# Patient Record
Sex: MALE | Race: WHITE | NOT HISPANIC OR LATINO
[De-identification: names, ages, dates, MRNs, and addresses within clinical notes are randomized per-mention and may not be internally consistent; named-entity substitution may affect disease eponyms.]

---

## 2023-04-05 PROBLEM — Z00.00 ENCOUNTER FOR PREVENTIVE HEALTH EXAMINATION: Status: ACTIVE | Noted: 2023-04-05

## 2023-04-19 ENCOUNTER — APPOINTMENT (OUTPATIENT)
Dept: VASCULAR SURGERY | Facility: CLINIC | Age: 78
End: 2023-04-19
Payer: MEDICARE

## 2023-04-19 VITALS
HEIGHT: 72 IN | BODY MASS INDEX: 21.13 KG/M2 | HEART RATE: 80 BPM | TEMPERATURE: 97 F | OXYGEN SATURATION: 100 % | DIASTOLIC BLOOD PRESSURE: 64 MMHG | WEIGHT: 156 LBS | SYSTOLIC BLOOD PRESSURE: 127 MMHG

## 2023-04-19 DIAGNOSIS — L97.409 NON-PRESSURE CHRONIC ULCER OF UNSPECIFIED HEEL AND MIDFOOT WITH UNSPECIFIED SEVERITY: ICD-10-CM

## 2023-04-19 DIAGNOSIS — R09.89 OTHER SPECIFIED SYMPTOMS AND SIGNS INVOLVING THE CIRCULATORY AND RESPIRATORY SYSTEMS: ICD-10-CM

## 2023-04-19 PROCEDURE — 99203 OFFICE O/P NEW LOW 30 MIN: CPT

## 2023-04-19 RX ORDER — MIRABEGRON 25 MG/1
25 TABLET, FILM COATED, EXTENDED RELEASE ORAL
Refills: 0 | Status: ACTIVE | COMMUNITY

## 2023-04-19 RX ORDER — CLONAZEPAM 0.12 MG/1
0.12 TABLET, ORALLY DISINTEGRATING ORAL
Refills: 0 | Status: ACTIVE | COMMUNITY

## 2023-04-19 RX ORDER — SERTRALINE HYDROCHLORIDE 100 MG/1
100 TABLET, FILM COATED ORAL
Refills: 0 | Status: ACTIVE | COMMUNITY

## 2023-04-19 RX ORDER — DOXYCYCLINE HYCLATE 100 MG/1
100 TABLET ORAL
Refills: 0 | Status: ACTIVE | COMMUNITY

## 2023-04-19 RX ORDER — AMLODIPINE BESYLATE 10 MG/1
10 TABLET ORAL
Refills: 0 | Status: ACTIVE | COMMUNITY

## 2023-04-19 NOTE — ASSESSMENT
[FreeTextEntry1] : Torey Delacruz is a 77 year old male presents for evaluation. \par \par > UE and LE wounds\par - Will obtain Raynaud's testing on BUE and LOS/PVRs w/ toe pressures for further evaluation.\par - Continue local wound care. Follow up with PCP/wound care regarding left finger wounds. \par - Follow up after above studies completed.

## 2023-04-19 NOTE — PHYSICAL EXAM
[2+] : right 2+ [0] : left 0 [Calm] : calm [de-identified] : Well-appearing  [de-identified] : EOMI, anicteric  [FreeTextEntry1] : BL palmar arch signals present. No ulnar signals present. \par Left DP/PT signals present.  [de-identified] : soft, nt, nd  [de-identified] : motor and sensation intact in all four extremities  [de-identified] : callus at right lateral fifth metatarsal head and left posterior heel. left lateral fifth finger and fourth finger swelling [de-identified] : A&Ox4

## 2023-04-19 NOTE — HISTORY OF PRESENT ILLNESS
[FreeTextEntry1] : Torey Delacruz is a 77 year old male who presents for evaluation of wounds. \par \par Referred by Dr. Jose Fraser (podiatry). \par \par Patient states that he was diagnosed with Raynaud's a while ago. He was diagnosed by his PCP. He has developed fissures and calluses on his feet that aren't healing. This has been ongoing for several years. Due to this, Dr. Fraser recommended evaluation by vascular surgery. He has been using Cerave healing ointment, silvadene. These did not work. Currently, he is using bacitracin in the fingers. Currently seeing wound care Dr. Gaetano Guillory. \par He has previously been evaluated by rheumatology and was placed on amlodipine. He does not regularly follow / rheum. \par \par + PMH: Raynaud's, degenerative disc disease, cervical arthritis, SDH, HTN, depression\par + PSH: Right second toe surgery, s/p hernia surgery, s/p SDH evacuation 10 years ago\par + FH: DM (mother)\par + SH: quit smoking in 1997, no etoh use, no illicit substances\par + Aller: PCN\par \par PCP is Dr. Josue Montana.

## 2023-06-06 ENCOUNTER — APPOINTMENT (OUTPATIENT)
Dept: ORTHOPEDIC SURGERY | Facility: CLINIC | Age: 78
End: 2023-06-06
Payer: MEDICARE

## 2023-06-06 DIAGNOSIS — I73.9 PERIPHERAL VASCULAR DISEASE, UNSPECIFIED: ICD-10-CM

## 2023-06-06 DIAGNOSIS — L97.511 NON-PRESSURE CHRONIC ULCER OF OTHER PART OF RIGHT FOOT LIMITED TO BREAKDOWN OF SKIN: ICD-10-CM

## 2023-06-06 DIAGNOSIS — M21.621 BUNIONETTE OF RIGHT FOOT: ICD-10-CM

## 2023-06-06 DIAGNOSIS — M21.622 BUNIONETTE OF LEFT FOOT: ICD-10-CM

## 2023-06-06 PROCEDURE — 73630 X-RAY EXAM OF FOOT: CPT | Mod: 50

## 2023-06-06 PROCEDURE — 99204 OFFICE O/P NEW MOD 45 MIN: CPT

## 2023-06-06 NOTE — IMAGING
[Bilateral] : foot bilaterally [Weight -] : weightbearing [There are no fractures, subluxations or dislocations. No significant abnormalities are seen] : There are no fractures, subluxations or dislocations. No significant abnormalities are seen [de-identified] : B/L OS peroneum with right 2nd hammer toe

## 2023-06-06 NOTE — HISTORY OF PRESENT ILLNESS
[8] : 8 [10] : 10 [Throbbing] : throbbing [Standing] : standing [Walking] : walking [Lying in bed] : lying in bed [de-identified] : 6/6/23: Patient is a 77 year old M who presents today for evaluation of their B/L feet. Pt states he has intermittent lateral foot pain (L>R) He has been seeing his PCP and Podiatrist which have prescribed him cream for it but doesn’t seem to help. He wears bandaids to the lateral aspect of his feet, which help. He was using lambs wool. Hx raynauds\par WB in sneakers [] : no [FreeTextEntry1] : B/L feet

## 2023-06-06 NOTE — DISCUSSION/SUMMARY
[de-identified] : Would need a vascular eval prior to any surgery. He has a fu appt this Friday.\par Discussed skin care. \par f/u 2 months \par \par Instructions: Entered by Tereza JEAN BAPTISTE acting as scribe.\par Dr. Kelly-\par The documentation recorded by the scribe accurately reflects the service I personally performed and the decisions made by me.\par

## 2023-06-06 NOTE — PHYSICAL EXAM
[Right] : right foot and ankle [3rd] : 3rd [Left] : left foot and ankle [5th] : 5th [NL (20)] : dorsiflexion 20 degrees [NL (40)] : plantar flexion 40 degrees [absent] : dorsalis pedis pulse: absent [] : no achilles tendon insertion tenderness [FreeTextEntry3] : Callous to the 5th metatarsal tuberosity. [de-identified] : cap refill 3 seconds

## 2024-05-31 ENCOUNTER — INPATIENT (INPATIENT)
Facility: HOSPITAL | Age: 79
LOS: 7 days | Discharge: NURSING, REHAB OR LONG-TERM CARE | DRG: 556 | End: 2024-06-08
Attending: INTERNAL MEDICINE | Admitting: INTERNAL MEDICINE
Payer: MEDICARE

## 2024-05-31 VITALS
WEIGHT: 164.91 LBS | RESPIRATION RATE: 18 BRPM | TEMPERATURE: 98 F | SYSTOLIC BLOOD PRESSURE: 128 MMHG | OXYGEN SATURATION: 99 % | DIASTOLIC BLOOD PRESSURE: 79 MMHG | HEART RATE: 94 BPM

## 2024-05-31 DIAGNOSIS — F41.9 ANXIETY DISORDER, UNSPECIFIED: ICD-10-CM

## 2024-05-31 DIAGNOSIS — R29.898 OTHER SYMPTOMS AND SIGNS INVOLVING THE MUSCULOSKELETAL SYSTEM: ICD-10-CM

## 2024-05-31 DIAGNOSIS — Z29.9 ENCOUNTER FOR PROPHYLACTIC MEASURES, UNSPECIFIED: ICD-10-CM

## 2024-05-31 LAB
ALBUMIN SERPL ELPH-MCNC: 3.7 G/DL — SIGNIFICANT CHANGE UP (ref 3.3–5)
ALP SERPL-CCNC: 67 U/L — SIGNIFICANT CHANGE UP (ref 40–120)
ALT FLD-CCNC: 33 U/L — SIGNIFICANT CHANGE UP (ref 12–78)
ANION GAP SERPL CALC-SCNC: 5 MMOL/L — SIGNIFICANT CHANGE UP (ref 5–17)
APTT BLD: 31.8 SEC — SIGNIFICANT CHANGE UP (ref 24.5–35.6)
AST SERPL-CCNC: 31 U/L — SIGNIFICANT CHANGE UP (ref 15–37)
BASOPHILS # BLD AUTO: 0.05 K/UL — SIGNIFICANT CHANGE UP (ref 0–0.2)
BASOPHILS NFR BLD AUTO: 0.6 % — SIGNIFICANT CHANGE UP (ref 0–2)
BILIRUB SERPL-MCNC: 0.5 MG/DL — SIGNIFICANT CHANGE UP (ref 0.2–1.2)
BUN SERPL-MCNC: 27 MG/DL — HIGH (ref 7–23)
CALCIUM SERPL-MCNC: 8.9 MG/DL — SIGNIFICANT CHANGE UP (ref 8.5–10.1)
CHLORIDE SERPL-SCNC: 107 MMOL/L — SIGNIFICANT CHANGE UP (ref 96–108)
CO2 SERPL-SCNC: 26 MMOL/L — SIGNIFICANT CHANGE UP (ref 22–31)
CREAT SERPL-MCNC: 1.3 MG/DL — SIGNIFICANT CHANGE UP (ref 0.5–1.3)
EGFR: 56 ML/MIN/1.73M2 — LOW
EOSINOPHIL # BLD AUTO: 0.12 K/UL — SIGNIFICANT CHANGE UP (ref 0–0.5)
EOSINOPHIL NFR BLD AUTO: 1.3 % — SIGNIFICANT CHANGE UP (ref 0–6)
GLUCOSE SERPL-MCNC: 102 MG/DL — HIGH (ref 70–99)
HCT VFR BLD CALC: 44.2 % — SIGNIFICANT CHANGE UP (ref 39–50)
HGB BLD-MCNC: 14.2 G/DL — SIGNIFICANT CHANGE UP (ref 13–17)
IMM GRANULOCYTES NFR BLD AUTO: 0.3 % — SIGNIFICANT CHANGE UP (ref 0–0.9)
INR BLD: 0.93 RATIO — SIGNIFICANT CHANGE UP (ref 0.85–1.18)
LYMPHOCYTES # BLD AUTO: 0.93 K/UL — LOW (ref 1–3.3)
LYMPHOCYTES # BLD AUTO: 10.4 % — LOW (ref 13–44)
MCHC RBC-ENTMCNC: 28.9 PG — SIGNIFICANT CHANGE UP (ref 27–34)
MCHC RBC-ENTMCNC: 32.1 GM/DL — SIGNIFICANT CHANGE UP (ref 32–36)
MCV RBC AUTO: 89.8 FL — SIGNIFICANT CHANGE UP (ref 80–100)
MONOCYTES # BLD AUTO: 0.68 K/UL — SIGNIFICANT CHANGE UP (ref 0–0.9)
MONOCYTES NFR BLD AUTO: 7.6 % — SIGNIFICANT CHANGE UP (ref 2–14)
NEUTROPHILS # BLD AUTO: 7.15 K/UL — SIGNIFICANT CHANGE UP (ref 1.8–7.4)
NEUTROPHILS NFR BLD AUTO: 79.8 % — HIGH (ref 43–77)
NRBC # BLD: 0 /100 WBCS — SIGNIFICANT CHANGE UP (ref 0–0)
PLATELET # BLD AUTO: 273 K/UL — SIGNIFICANT CHANGE UP (ref 150–400)
POTASSIUM SERPL-MCNC: 4.3 MMOL/L — SIGNIFICANT CHANGE UP (ref 3.5–5.3)
POTASSIUM SERPL-SCNC: 4.3 MMOL/L — SIGNIFICANT CHANGE UP (ref 3.5–5.3)
PROT SERPL-MCNC: 7.7 G/DL — SIGNIFICANT CHANGE UP (ref 6–8.3)
PROTHROM AB SERPL-ACNC: 10.9 SEC — SIGNIFICANT CHANGE UP (ref 9.5–13)
RBC # BLD: 4.92 M/UL — SIGNIFICANT CHANGE UP (ref 4.2–5.8)
RBC # FLD: 13.5 % — SIGNIFICANT CHANGE UP (ref 10.3–14.5)
SODIUM SERPL-SCNC: 138 MMOL/L — SIGNIFICANT CHANGE UP (ref 135–145)
WBC # BLD: 8.96 K/UL — SIGNIFICANT CHANGE UP (ref 3.8–10.5)
WBC # FLD AUTO: 8.96 K/UL — SIGNIFICANT CHANGE UP (ref 3.8–10.5)

## 2024-05-31 PROCEDURE — 99285 EMERGENCY DEPT VISIT HI MDM: CPT

## 2024-05-31 PROCEDURE — 72148 MRI LUMBAR SPINE W/O DYE: CPT | Mod: 26

## 2024-05-31 PROCEDURE — 99223 1ST HOSP IP/OBS HIGH 75: CPT | Mod: GC

## 2024-05-31 PROCEDURE — 72146 MRI CHEST SPINE W/O DYE: CPT | Mod: 26

## 2024-05-31 PROCEDURE — 93010 ELECTROCARDIOGRAM REPORT: CPT

## 2024-05-31 PROCEDURE — 71045 X-RAY EXAM CHEST 1 VIEW: CPT | Mod: 26

## 2024-05-31 PROCEDURE — 72141 MRI NECK SPINE W/O DYE: CPT | Mod: 26

## 2024-05-31 RX ORDER — GABAPENTIN 400 MG/1
300 CAPSULE ORAL
Refills: 0 | Status: DISCONTINUED | OUTPATIENT
Start: 2024-05-31 | End: 2024-06-04

## 2024-05-31 RX ORDER — FLUTICASONE PROPIONATE 50 MCG
1 SPRAY, SUSPENSION NASAL
Refills: 0 | DISCHARGE

## 2024-05-31 RX ORDER — FLUTICASONE PROPIONATE 50 MCG
1 SPRAY, SUSPENSION NASAL
Refills: 0 | Status: DISCONTINUED | OUTPATIENT
Start: 2024-05-31 | End: 2024-06-04

## 2024-05-31 RX ORDER — CALCIUM CARBONATE 500(1250)
1 TABLET ORAL ONCE
Refills: 0 | Status: COMPLETED | OUTPATIENT
Start: 2024-05-31 | End: 2024-05-31

## 2024-05-31 RX ORDER — DEXAMETHASONE 0.5 MG/5ML
10 ELIXIR ORAL EVERY 8 HOURS
Refills: 0 | Status: COMPLETED | OUTPATIENT
Start: 2024-05-31 | End: 2024-06-01

## 2024-05-31 RX ORDER — CYCLOBENZAPRINE HYDROCHLORIDE 10 MG/1
10 TABLET, FILM COATED ORAL THREE TIMES A DAY
Refills: 0 | Status: DISCONTINUED | OUTPATIENT
Start: 2024-05-31 | End: 2024-06-04

## 2024-05-31 RX ORDER — OXYCODONE HYDROCHLORIDE 5 MG/1
5 TABLET ORAL EVERY 6 HOURS
Refills: 0 | Status: DISCONTINUED | OUTPATIENT
Start: 2024-05-31 | End: 2024-06-03

## 2024-05-31 RX ORDER — DULOXETINE HYDROCHLORIDE 30 MG/1
60 CAPSULE, DELAYED RELEASE ORAL DAILY
Refills: 0 | Status: DISCONTINUED | OUTPATIENT
Start: 2024-05-31 | End: 2024-06-04

## 2024-05-31 RX ORDER — SENNA PLUS 8.6 MG/1
2 TABLET ORAL AT BEDTIME
Refills: 0 | Status: DISCONTINUED | OUTPATIENT
Start: 2024-05-31 | End: 2024-06-04

## 2024-05-31 RX ORDER — CLONAZEPAM 1 MG
1 TABLET ORAL
Refills: 0 | DISCHARGE

## 2024-05-31 RX ORDER — POLYETHYLENE GLYCOL 3350 17 G/17G
17 POWDER, FOR SOLUTION ORAL DAILY
Refills: 0 | Status: DISCONTINUED | OUTPATIENT
Start: 2024-05-31 | End: 2024-06-04

## 2024-05-31 RX ORDER — ACETAMINOPHEN 500 MG
650 TABLET ORAL EVERY 6 HOURS
Refills: 0 | Status: DISCONTINUED | OUTPATIENT
Start: 2024-05-31 | End: 2024-06-04

## 2024-05-31 RX ORDER — SODIUM CHLORIDE 9 MG/ML
1000 INJECTION INTRAMUSCULAR; INTRAVENOUS; SUBCUTANEOUS ONCE
Refills: 0 | Status: COMPLETED | OUTPATIENT
Start: 2024-05-31 | End: 2024-05-31

## 2024-05-31 RX ORDER — SODIUM CHLORIDE 9 MG/ML
1000 INJECTION INTRAMUSCULAR; INTRAVENOUS; SUBCUTANEOUS
Refills: 0 | Status: DISCONTINUED | OUTPATIENT
Start: 2024-05-31 | End: 2024-06-01

## 2024-05-31 RX ORDER — DULOXETINE HYDROCHLORIDE 30 MG/1
1 CAPSULE, DELAYED RELEASE ORAL
Refills: 0 | DISCHARGE

## 2024-05-31 RX ADMIN — Medication 102 MILLIGRAM(S): at 15:09

## 2024-05-31 RX ADMIN — GABAPENTIN 300 MILLIGRAM(S): 400 CAPSULE ORAL at 18:28

## 2024-05-31 RX ADMIN — SODIUM CHLORIDE 1000 MILLILITER(S): 9 INJECTION INTRAMUSCULAR; INTRAVENOUS; SUBCUTANEOUS at 12:57

## 2024-05-31 RX ADMIN — Medication 102 MILLIGRAM(S): at 22:30

## 2024-05-31 RX ADMIN — Medication 1 TABLET(S): at 23:57

## 2024-05-31 NOTE — CONSULT NOTE ADULT - SUBJECTIVE AND OBJECTIVE BOX
Patient is a 78yMale walker ambulator who presents to [facility] ED w/ a c/o of BLLE weakness. Patient states that he has been experiencing BLLE weakness for the past. Patient denies any trauma. Denies HS/LOC. Patient states that he has been having difficulty walking, and states that he needed a wheelchair today due to waking up today with severe weakness and inability to ambulate. Denies any numbness or tingling. Denies bowel or bladder incontinence or saddle anesthesia. Denies having any other pain elsewhere. Patient reports a left clavicle fracture from December 2023 which was managed non-op by Dr. Quigley. No other orthopedic concerns at this time.        penicillin (Unknown)      PHYSICAL EXAM:  T(C): 36.5 (05-31-24 @ 11:29), Max: 36.5 (05-31-24 @ 11:29)  HR: 94 (05-31-24 @ 11:29) (94 - 94)  BP: 128/79 (05-31-24 @ 11:29) (128/79 - 128/79)  RR: 18 (05-31-24 @ 11:29) (18 - 18)  SpO2: 99% (05-31-24 @ 11:29) (99% - 99%)    Gen: NAD, Resting comfortably  Spine:  NTTP C/T/L Spines  Motor:                   C5                C6              C7               C8           T1   R            5/5                5/5            5/5             5/5          5/5  L             5/5               5/5             5/5             5/5          5/5                L2             L3             L4               L5            S1  R         2/5           4/5          1/5               1/5           4/5  L          2/5          4/5           2/5              2/5           4/5    No dorsiflexion of R ankle  L Ankle can dorsiflex 5 degrees short of neutral    Sensory:            C5         C6         C7      C8       T1        (0=absent, 1=impaired, 2=normal, NT=not testable)  R         2            2           2        2         2  L          2            2           2        2         2               L2          L3         L4      L5       S1         (0=absent, 1=impaired, 2=normal, NT=not testable)  R         2            2            2        2        2  L          2            2           2        2         2    Negative Guardado, Babinski, Clonus BL  Rectal tone intact    Secondary Survey:   RLE/LLE/RUE/LUE: No TTP over bony prominences, SILT, palpable pulses, full/painless range of motion, compartments soft      A/P: 78M who presents with BLLE weakness    FU MR C/T/L Spines w/w/o  Recommend Decadron 10 mg q8  Analgesia  WBAT  DVT ppx per primary  PT/OT  Final plan pending results of MRI  Discussed with attending who is in agreement with above plan   Patient is a 78yMale walker ambulator who presents to [facility] ED w/ a c/o of BLLE weakness. Patient states that he has been experiencing BLLE weakness for the past. Patient denies any trauma. Denies HS/LOC. Patient states that he has been having difficulty walking, and states that he needed a wheelchair today due to waking up today with severe weakness and inability to ambulate. Denies any numbness or tingling. Denies bowel or bladder incontinence or saddle anesthesia. Denies having any other pain elsewhere. Patient reports a left clavicle fracture from December 2023 which was managed non-op by Dr. Quigley. No other orthopedic concerns at this time.        penicillin (Unknown)      PHYSICAL EXAM:  T(C): 36.5 (05-31-24 @ 11:29), Max: 36.5 (05-31-24 @ 11:29)  HR: 94 (05-31-24 @ 11:29) (94 - 94)  BP: 128/79 (05-31-24 @ 11:29) (128/79 - 128/79)  RR: 18 (05-31-24 @ 11:29) (18 - 18)  SpO2: 99% (05-31-24 @ 11:29) (99% - 99%)    Gen: NAD, Resting comfortably  Spine:  NTTP C/T/L Spines  Motor:                   C5                C6              C7               C8           T1   R            5/5                5/5            5/5             5/5          5/5  L             5/5               5/5             5/5             5/5          5/5                L2             L3             L4               L5            S1  R         2/5           4/5          1/5               1/5           4/5  L          2/5          4/5           2/5              2/5           4/5    No dorsiflexion of R ankle  L Ankle can dorsiflex 5 degrees short of neutral    Sensory:            C5         C6         C7      C8       T1        (0=absent, 1=impaired, 2=normal, NT=not testable)  R         2            2           2        2         2  L          2            2           2        2         2               L2          L3         L4      L5       S1         (0=absent, 1=impaired, 2=normal, NT=not testable)  R         2            2            2        2        2  L          2            2           2        2         2    Negative Guardado, Babinski, Clonus BL  Rectal tone intact    Secondary Survey:   RLE/LLE/RUE/LUE: No TTP over bony prominences, SILT, palpable pulses, full/painless range of motion, compartments soft      A/P: 78M who presents with BLLE weakness    FU MR C/T/L Spines w/w/o  Recommend Decadron 10 mg q8  Analgesia  WBAT  Hold DVT ppx until MRI results and final plan is decided on  PT/OT  Final plan pending results of MRI  Discussed with attending who is in agreement with above plan   Patient is a 78yMale walker ambulator who presents to [facility] ED w/ a c/o of BLLE weakness. Patient states that he has been experiencing BLLE weakness for the past few months. Patient denies any trauma. Denies HS/LOC. Patient states that he has been having difficulty walking, and states that he needed a wheelchair today due to waking up today with severe weakness and inability to ambulate. Denies any numbness or tingling. Denies bowel or bladder incontinence or saddle anesthesia. Denies having any other pain elsewhere. Patient reports a left clavicle fracture from December 2023 which was managed non-op by Dr. Quigley. No other orthopedic concerns at this time.        penicillin (Unknown)      PHYSICAL EXAM:  T(C): 36.5 (05-31-24 @ 11:29), Max: 36.5 (05-31-24 @ 11:29)  HR: 94 (05-31-24 @ 11:29) (94 - 94)  BP: 128/79 (05-31-24 @ 11:29) (128/79 - 128/79)  RR: 18 (05-31-24 @ 11:29) (18 - 18)  SpO2: 99% (05-31-24 @ 11:29) (99% - 99%)    Gen: NAD, Resting comfortably  Spine:  NTTP C/T/L Spines  Motor:                   C5                C6              C7               C8           T1   R            5/5                5/5            5/5             5/5          5/5  L             5/5               5/5             5/5             5/5          5/5                L2             L3             L4               L5            S1  R         2/5           4/5          1/5               1/5           4/5  L          2/5          4/5           2/5              2/5           4/5    No dorsiflexion of R ankle  L Ankle can dorsiflex 5 degrees short of neutral    Sensory:            C5         C6         C7      C8       T1        (0=absent, 1=impaired, 2=normal, NT=not testable)  R         2            2           2        2         2  L          2            2           2        2         2               L2          L3         L4      L5       S1         (0=absent, 1=impaired, 2=normal, NT=not testable)  R         2            2            2        2        2  L          2            2           2        2         2    Negative Guardado, Babinski, Clonus BL  Rectal tone intact    Secondary Survey:   RLE/LLE/RUE/LUE: No TTP over bony prominences, SILT, palpable pulses, full/painless range of motion, compartments soft      A/P: 78M who presents with BLLE weakness    FU MR C/T/L Spines w/w/o  Recommend Decadron 10 mg q8  Analgesia  WBAT  Hold DVT ppx until MRI results and final plan is decided on  PT/OT  Final plan pending results of MRI  Discussed with attending who is in agreement with above plan

## 2024-05-31 NOTE — PATIENT PROFILE ADULT - NSPRESCRALCFREQ_GEN_A_NUR
-in the setting significant dehydration and GI losses with HANANE although was up trending at last blood check 6 months previously  -admission calcium corrects to 12.2, gradually downtrending  -continue IV fluids   -check PTH low, not consistent with primary hyperparathyroidism  Will hold vitamin-D supplementation at discharge      Never

## 2024-05-31 NOTE — PATIENT PROFILE ADULT - FALL HARM RISK - HARM RISK INTERVENTIONS
Assistance with ambulation/Assistance OOB with selected safe patient handling equipment/Communicate Risk of Fall with Harm to all staff/Discuss with provider need for PT consult/Monitor for mental status changes/Monitor gait and stability/Provide patient with walking aids - walker, cane, crutches/Reinforce activity limits and safety measures with patient and family/Reorient to person, place and time as needed/Review medications for side effects contributing to fall risk/Sit up slowly, dangle for a short time, stand at bedside before walking/Tailored Fall Risk Interventions/Use of alarms - bed, chair and/or voice tab/Visual Cue: Yellow wristband and red socks/Bed in lowest position, wheels locked, appropriate side rails in place/Call bell, personal items and telephone in reach/Instruct patient to call for assistance before getting out of bed or chair/Non-slip footwear when patient is out of bed/[place] to call system/Physically safe environment - no spills, clutter or unnecessary equipment/Purposeful Proactive Rounding/Room/bathroom lighting operational, light cord in reach

## 2024-05-31 NOTE — H&P ADULT - PROBLEM SELECTOR PLAN 1
Patient presenting with worsening lower extremity weakness bilaterally, sent to [facility] ED by orthopedics Dr. Richards  - MRI cervical, thoracic and lumbar spine ordered  - Ativan ordered for MRI  - Decadron 10 mg q8 x3 days  - Pain control: gabapentin, flexeril   - WBAT  - PT/OT  - NPO after midnight  - Final plan pending results of MRI  - Ortho Dr. Richards following Patient presenting with worsening lower extremity weakness bilaterally, sent to [facility] ED by orthopedics Dr. Richards  - MRI cervical, thoracic and lumbar spine ordered  - Ativan ordered for MRI  - Decadron 10 mg q8 x3 days  - Pain control: Tylenol PRN, gabapentin, flexeril PRN  - WBAT  - PT/OT  - NPO after midnight  - Final plan pending results of MRI  - Ortho Dr. Richards following Patient presenting with worsening lower extremity weakness bilaterally, sent to [facility] ED by orthopedics Dr. Richards  - MRI cervical, thoracic and lumbar spine ordered  - Ativan ordered for MRI  - Decadron 10 mg q8 x3 days  - Pain control: Tylenol PRN, gabapentin, flexeril PRN  - WBAT  - PT/OT  - NPO after midnight  - Maintenance IVF with NS at 75 cc/hr   - Final plan pending results of MRI  - Ortho Dr. Richards following Patient presenting with worsening lower extremity weakness bilaterally, sent to [facility] ED by orthopedics Dr. Richards  - MRI CERVICAL SPINE: Multilevel degenerative changes. C2-C3 ken[place] spinal cord compression without abnormal intrinsic cord signal. Remaining levels demonstrate predominantly mild spinal canal stenosis. Multilevel neural foraminal narrowing: C2-C3 mild to moderate left neural foraminal narrowing, C3-C4 marked bilateral neural foraminal narrowing, C4-C5 moderate left neural foraminal narrowing, C5-C6 moderate to marked right and mild to moderate left neural foraminal narrowing, C6-C7 moderate to marked right and moderate left neural foraminal narrowing.  - MRI THORACIC SPINE: Multilevel degenerative disc disease resulting in mild multilevel spinal  canal stenosis. Moderate left neural foraminal narrowing at T9 and T11-T12.  - MRI LUMBAR SPINE: Multilevel degenerative changes. L2-L3 marked thecal sac compression, L3-L4 moderate thecal sac compression with marked right and moderate left lateral recess stenosis, L4-L5 marked thecal sac compression, L5-S1 mass effect upon the bilateral descending S1 nerve roots. L3-L4 moderate right neural foraminal narrowing, L4-L5 moderate right neural foraminal narrowing, L5-S1 moderate right and moderate to severe left neural foraminal narrowing.  - Ativan ordered for MRI  - Decadron 10 mg q8 x3 days  - Pain control: Tylenol PRN, gabapentin, flexeril PRN  - WBAT  - PT/OT  - NPO after midnight  - Maintenance IVF with NS at 75 cc/hr   - Ortho Dr. Richards following, f/u final recs Patient presenting with worsening lower extremity weakness bilaterally, sent to [facility] ED by orthopedics Dr. Richards  - MRI CERVICAL SPINE: Multilevel degenerative changes. C2-C3 ken[place] spinal cord compression without abnormal intrinsic cord signal. Remaining levels demonstrate predominantly mild spinal canal stenosis. Multilevel neural foraminal narrowing: C2-C3 mild to moderate left neural foraminal narrowing, C3-C4 marked bilateral neural foraminal narrowing, C4-C5 moderate left neural foraminal narrowing, C5-C6 moderate to marked right and mild to moderate left neural foraminal narrowing, C6-C7 moderate to marked right and moderate left neural foraminal narrowing.  - MRI THORACIC SPINE: Multilevel degenerative disc disease resulting in mild multilevel spinal  canal stenosis. Moderate left neural foraminal narrowing at T9 and T11-T12.  - MRI LUMBAR SPINE: Multilevel degenerative changes. L2-L3 marked thecal sac compression, L3-L4 moderate thecal sac compression with marked right and moderate left lateral recess stenosis, L4-L5 marked thecal sac compression, L5-S1 mass effect upon the bilateral descending S1 nerve roots. L3-L4 moderate right neural foraminal narrowing, L4-L5 moderate right neural foraminal narrowing, L5-S1 moderate right and moderate to severe left neural foraminal narrowing.  - Decadron 10 mg q8 x3 days  - Pain control: Tylenol PRN, gabapentin, flexeril PRN  - WBAT  - PT/OT  - NPO after midnight  - Maintenance IVF with NS at 75 cc/hr   -CARDIAC CLEARANCE REQUESTED Dr Clark  - Ortho Dr. Richards following, f/u final recs Patient presenting with worsening lower extremity weakness bilaterally, sent to \[facility]\"" ED by orthopedics Dr. Richards  - MRI CERVICAL SPINE: Multilevel degenerative changes. C2-C3 ken[place] spinal cord compression without abnormal intrinsic cord signal. Remaining levels demonstrate predominantly mild spinal canal stenosis. Multilevel neural foraminal narrowing: C2-C3 mild to moderate left neural foraminal narrowing, C3-C4 marked bilateral neural foraminal narrowing, C4-C5 moderate left neural foraminal narrowing, C5-C6 moderate to marked right and mild to moderate left neural foraminal narrowing, C6-C7 moderate to marked right and moderate left neural foraminal narrowing.  - MRI THORACIC SPINE: Multilevel degenerative disc disease resulting in mild multilevel spinal  canal stenosis. Moderate left neural foraminal narrowing at T9 and T11-T12.  - MRI LUMBAR SPINE: Multilevel degenerative changes. L2-L3 marked thecal sac compression, L3-L4 moderate thecal sac compression with marked right and moderate left lateral recess stenosis, L4-L5 marked thecal sac compression, L5-S1 mass effect upon the bilateral descending S1 nerve roots. L3-L4 moderate right neural foraminal narrowing, L4-L5 moderate right neural foraminal narrowing, L5-S1 moderate right and moderate to severe left neural foraminal narrowing.  - Decadron 10 mg q8 x3 days  - Pain control: Tylenol PRN for mild pain, oxy 5mg q6 PRN for moderate pain, gabapentin, flexeril PRN  - WBAT  - PT/OT  - NPO after midnight  - Maintenance IVF with NS at 75 cc/hr   -CARDIAC CLEARANCE REQUESTED Dr Clark  - Ortho Dr. Richards following, f/u final recs

## 2024-05-31 NOTE — ED PROVIDER NOTE - PROGRESS NOTE DETAILS
Discussed with orthopedics, in the ED to see the patient.  They will order MRIs, should admit to medicine for further treatment, IV steroids. They will order the MRI and the steroids. Discussed with Dr. Tima Gardner, will see patient to admit.

## 2024-05-31 NOTE — CARE COORDINATION ASSESSMENT. - NSCAREPROVIDERS_GEN_ALL_CORE_FT
CARE PROVIDERS:  Accepting Physician: Tima Gardner  Admitting: Tima Gardner  Attending: Tima Gardner  Consultant: Guicho Wiggins  Consultant: Serafin Fong  Consultant: Tre Clark  Consultant: Silver Lawton  Covering Team: Klever Rich  ED Attending: Rojas Reynolds  ED Nurse: Giulia Banks  Nurse: Natalie Kapoor  Nurse: Leatha Burden  Ordered: ServiceAccount, [facility] HospitalMLM  Primary Team: Tima Gardner  Primary Team: Sondra Haskins  Research: Luz Elena Garcia  : Gahzal Kothari  Team: [facility], Team  UR// Supp. Assoc.: Alivia Burch

## 2024-05-31 NOTE — H&P ADULT - ATTENDING COMMENTS
79yo M PMH spinal stenosis presents to ED with bilateral lower extremity weakness. Admitted for bilateral lower extremity weakness, need for MRI for further evaluation.     HPI as above.     T(C): 37.1 (05-31-24 @ 16:23), Max: 37.1 (05-31-24 @ 16:23)  HR: 87 (05-31-24 @ 16:23) (87 - 94)  BP: 169/81 (05-31-24 @ 16:23) (128/79 - 169/81)  RR: 16 (05-31-24 @ 16:23) (16 - 18)  SpO2: 96% (05-31-24 @ 16:23) (96% - 99%)  Wt(kg): --    Physical Exam:   GENERAL: well-groomed, well-developed, NAD  HEENT: head NC/AT; EOM intact, PERRLA, conjunctiva & sclera clear; hearing grossly intact, moist mucous membranes  NECK: supple, no JVD  RESPIRATORY: CTA B/L, no wheezing, rales, rhonchi or rubs  CARDIOVASCULAR: S1&S2, RRR, no murmurs or gallops  ABDOMEN: soft, non-tender, non-distended, + Bowel sounds x4 quadrants, no guarding, rebound or rigidity  MUSCULOSKELETAL:  no clubbing, cyanosis or edema of all 4 extremities  LYMPH: no cervical lymphadenopathy  VASCULAR: Radial pulses 2+ bilaterally, no varicose veins   SKIN: warm and dry, color normal  NEUROLOGIC: AA&O X3, CN2-12 intact w/ no focal deficits, MAEx4, muscle strength 5/5 UE b/l, RLE and LLE 3/5    EKG NSR without acute ischemic changes.    Plan:  LE weakness: continue decadron  Patient is medically optimized for spinal surgery if clinically indicated. Patient has no modifiable risk factors at this time. No evidence of heart failure on exam.   CARDIAC CLEARANCE IS PENDING  -pain control as ordered    remainder as above

## 2024-05-31 NOTE — H&P ADULT - NSHPREVIEWOFSYSTEMS_GEN_ALL_CORE
CONSTITUTIONAL: denies fever, chills, fatigue, weakness  HEENT: denies blurred vision, sore throat  SKIN: denies new lesions, rash  CARDIOVASCULAR: denies chest pain, chest pressure, palpitations  RESPIRATORY: denies shortness of breath, sputum production  GASTROINTESTINAL: denies nausea, vomiting, diarrhea, abdominal pain  GENITOURINARY: denies dysuria, discharge  NEUROLOGICAL: denies numbness, headache, focal weakness  MUSCULOSKELETAL: denies new joint pain, muscle aches  HEMATOLOGIC: denies gross bleeding, bruising  LYMPHATICS: denies enlarged lymph nodes, extremity swelling  PSYCHIATRIC: denies recent changes in anxiety, depression  ENDOCRINOLOGIC: denies sweating, cold or heat intolerance CONSTITUTIONAL: denies fever, chills, fatigue, weakness  HEENT: denies blurred vision, sore throat  SKIN: denies new lesions, rash  CARDIOVASCULAR: denies chest pain, chest pressure, palpitations  RESPIRATORY: denies shortness of breath, sputum production  GASTROINTESTINAL: denies nausea, vomiting, diarrhea, abdominal pain  GENITOURINARY: denies dysuria, discharge  NEUROLOGICAL: +acute on chronic leg weakness, denies numbness, headache, or other focal weakness  MUSCULOSKELETAL: denies new joint pain, muscle aches  HEMATOLOGIC: denies gross bleeding, bruising  LYMPHATICS: denies enlarged lymph nodes, extremity swelling  PSYCHIATRIC: denies recent changes in anxiety, depression  ENDOCRINOLOGIC: denies sweating, cold or heat intolerance CONSTITUTIONAL: denies fever, chills, fatigue  HEENT: denies blurred vision, sore throat  SKIN: denies new lesions, rash  CARDIOVASCULAR: denies chest pain, chest pressure, palpitations  RESPIRATORY: denies shortness of breath, sputum production  GASTROINTESTINAL: denies nausea, vomiting, diarrhea, abdominal pain  GENITOURINARY: denies dysuria, discharge  NEUROLOGICAL: +acute on chronic leg weakness, denies numbness, headache, or other focal weakness  MUSCULOSKELETAL: denies new joint pain, muscle aches  HEMATOLOGIC: denies gross bleeding, bruising  LYMPHATICS: denies enlarged lymph nodes, extremity swelling  PSYCHIATRIC: denies recent changes in anxiety, depression  ENDOCRINOLOGIC: denies sweating, cold or heat intolerance

## 2024-05-31 NOTE — H&P ADULT - PROBLEM SELECTOR PLAN 2
Per ortho, hold DVT ppx until MRI results DVT ppx:   - Per ortho, hold DVT ppx until MRI results Chronic:  - c/w home Duloxetine  - will hold clonazepam, has not been taking it in recent months

## 2024-05-31 NOTE — H&P ADULT - NSHPPHYSICALEXAM_GEN_ALL_CORE
T(C): 36.5 (05-31-24 @ 11:29), Max: 36.5 (05-31-24 @ 11:29)  HR: 94 (05-31-24 @ 11:29) (94 - 94)  BP: 128/79 (05-31-24 @ 11:29) (128/79 - 128/79)  RR: 18 (05-31-24 @ 11:29) (18 - 18)  SpO2: 99% (05-31-24 @ 11:29) (99% - 99%)  Wt(kg): --    Physical Exam:   GENERAL: well-groomed, well-developed, NAD  HEENT: head NC/AT; EOM intact, PERRLA, conjunctiva & sclera clear; hearing grossly intact, moist mucous membranes  NECK: supple, no JVD  RESPIRATORY: CTA B/L, no wheezing, rales, rhonchi or rubs  CARDIOVASCULAR: S1&S2, RRR, no murmurs or gallops  ABDOMEN: soft, non-tender, non-distended, + Bowel sounds x4 quadrants, no guarding, rebound or rigidity  MUSCULOSKELETAL:  no clubbing, cyanosis or edema of all 4 extremities  LYMPH: no cervical lymphadenopathy  VASCULAR: Radial pulses 2+ bilaterally, no varicose veins   SKIN: warm and dry, color normal  NEUROLOGIC: AA&O X3, CN2-12 intact w/ no focal deficits, no sensory loss, motor Strength 5/5 in UE & LE B/L  Psych: Normal mood and affect, normal behavior T(C): 36.5 (05-31-24 @ 11:29), Max: 36.5 (05-31-24 @ 11:29)  HR: 94 (05-31-24 @ 11:29) (94 - 94)  BP: 128/79 (05-31-24 @ 11:29) (128/79 - 128/79)  RR: 18 (05-31-24 @ 11:29) (18 - 18)  SpO2: 99% (05-31-24 @ 11:29) (99% - 99%)  Wt(kg): --    Physical Exam:   GENERAL: well-groomed, well-developed, NAD  HEENT: head NC/AT; EOM intact, PERRLA, conjunctiva & sclera clear; hearing grossly intact, moist mucous membranes  NECK: supple, no JVD  RESPIRATORY: CTA B/L, no wheezing, rales, rhonchi or rubs  CARDIOVASCULAR: S1&S2, RRR, no murmurs or gallops  ABDOMEN: soft, non-tender, non-distended, + Bowel sounds x4 quadrants, no guarding, rebound or rigidity  MUSCULOSKELETAL:  no clubbing, cyanosis or edema of all 4 extremities  LYMPH: no cervical lymphadenopathy  VASCULAR: Radial pulses 2+ bilaterally, no varicose veins   SKIN: warm and dry, color normal  NEUROLOGIC: AA&O X3, PERRLA   - bilateral hip flexion 3/5 strength,   - bilateral knee flexion 4/5 strength  - bilateral UE 4/5 strength  - no sensory loss throughout extremities  Psych: Normal mood and affect, normal behavior T(C): 36.5 (05-31-24 @ 11:29), Max: 36.5 (05-31-24 @ 11:29)  HR: 94 (05-31-24 @ 11:29) (94 - 94)  BP: 128/79 (05-31-24 @ 11:29) (128/79 - 128/79)  RR: 18 (05-31-24 @ 11:29) (18 - 18)  SpO2: 99% (05-31-24 @ 11:29) (99% - 99%)  Wt(kg): --    Physical Exam:   GENERAL: well-groomed, well-developed, NAD  HEENT: head NC/AT; EOM intact, PERRLA, conjunctiva & sclera clear; hearing grossly intact, moist mucous membranes  NECK: supple, no JVD  RESPIRATORY: CTA B/L, no wheezing, rales, rhonchi or rubs  CARDIOVASCULAR: S1&S2, RRR, no murmurs or gallops  ABDOMEN: soft, non-tender, non-distended, + Bowel sounds x4 quadrants, no guarding, rebound or rigidity  MUSCULOSKELETAL:  no clubbing, cyanosis or edema of all 4 extremities  LYMPH: no cervical lymphadenopathy  VASCULAR: Radial pulses 2+ bilaterally, no varicose veins   SKIN: warm and dry, color normal  NEUROLOGIC: AA&O X3, PERRLA   - bilateral UE 4/5 strength  - bilateral hip flexion 3/5 strength,   - bilateral knee flexion 4/5 strength  - bilateral dorsiflexion 4/5 strength  - bilateral plantar flexion 4/5 strength  - no sensory loss throughout extremities  Psych: Normal mood and affect, normal behavior

## 2024-05-31 NOTE — PATIENT PROFILE ADULT - NUMBER OF YRS
Medicare Part B Preventive Services Limitations Recommendation Scheduled   Bone Mass Measurement  (age 72 & older, biennial) Requires diagnosis related to osteoporosis or estrogen deficiency. Biennial benefit unless patient has history of long-term glucocorticoid tx or baseline is needed because initial test was by other method Every 2 years or more frequently if medically necessary. N/A       Cardiovascular Screening Blood Tests (every 5 years)  Total cholesterol, HDL, Triglycerides Order as a panel if possible Every 5 years. Done:  7/28/2016         Colorectal Cancer Screening  -Fecal occult blood test (annual)  -Flexible sigmoidoscopy (5y)  -Screening colonoscopy (10y)  -Barium Enema Colorectal cancer screening, ages 54-65. For all patients 48 and older:  -Annual fecal occult blood test or  colonoscopy every 10 years or  -Flexible sigmoidoscopy every 5 years or  -lower endoscopy to be performed more frequently, if advised by GI. Done:  11/10/2015         Counseling to Prevent Tobacco Use (up to 8 sessions per year)  - Counseling greater than 3 and up to 10 minutes  - Counseling greater than 10 minutes Patients must be asymptomatic of tobacco-related conditions to receive as preventive service Two cessation counseling attempts (up to 8 counseling sessions) per year. N/A   Diabetes Screening Tests (at least every 3 years, Medicare covers annually or at 6-month intervals for prediabetic patients)    Fasting blood sugar (FBS) or glucose tolerance test (GTT) Patient must be diagnosed with one of the following:  -Hypertension, Dyslipidemia, obesity, previous impaired FBS or GTT  Or any two of the following: overweight, FH of diabetes, age ? 72, history of gestational diabetes, birth of baby weighing more than 9 pounds Annually or every 6 months if previous diagnosis of elevated FBS, elevated HbA1c, or impaired GTT, or glucosuria.  Done:  7/28/2016         Diabetes Self-Management Training (DSMT) (no USPSTF recommendation) Requires referral by treating physician for patient with diabetes or renal disease. 10 hours of initial DSMT session of no less than 30 minutes each in a continuous 12-month period. 2 hours of follow-up DSMT in subsequent years. Up to 10 hours of initial training within a continuous 12 month period of subsequent years: up to 2 hours of follow-up training each year after the initial year. N/A   Glaucoma Screening (no USPSTF recommendation) Diabetes mellitus, family history, , age 48 or over,  American, age 72 or over Annually for covered beneficiaries. Done:  1/2017         Human Immunodeficiency Virus (HIV) Screening (annually for increased risk patients)  HIV-1 and HIV-2 by EIA, FELIX, rapid antibody test, or oral mucosa transudate Patient must be at increased risk for HIV infection per USPSTF guidelines or pregnant. Tests covered annually for patients at increased risk. Pregnant patients may receive up to 3 test during pregnancy. Annually for beneficiaries at increased risk, including anyone who asks for the test. Not high risk   Medical Nutrition Therapy (MNT) (for diabetes or renal disease not recommended schedule) Requires referral by treating physician for patient with diabetes or renal disease. Can be provided in same year as diabetes self-management training (DSMT), and CMS recommends medical nutrition therapy take place after DSMT. Up to 3 hours for initial year and 2 hours in subsequent years. First year: 3 hours of one-on-one counseling or subsequent years: 2 hours.  N/A   Prostate Cancer Screening (annually up to age 76)  - Digital rectal exam (WEN)  - Prostate specific antigen (PSA) Annually (age 48 or over), WEN not paid separately when covered E/M service is provided on same date Once every 12 months for patients age older than 48years of age includes: digital rectal exam and/or prostate specific antigen test. Done:  2/1/2017  PSA: 0.8       Seasonal Influenza Vaccination (annually)  Once per fall or winter season. Done:  12/9/2016         Pneumococcal Vaccination (once after 72)  Once after age 72 and if more than 5 years since last vaccination and/or uncertainty of vaccine status. Pneumococcal:  1/1/2007    Prevnar 13:  8/3/2015   Hepatitis B Vaccinations (if medium/high risk) Medium/high risk factors:  End-stage renal disease,  Hemophiliacs who received Factor VIII or IX concentrates, Clients of institutions for the mentally retarded, Persons who live in the same house as a HepB virus carrier, Homosexual men, Illicit injectable drug abusers. Schedule course of vaccines if patient not previously vaccinated  *additional shots if medically necessary. Not high risk   Screening Mammography (biennial age 54-69)? Annually (age 36 or over) Age 28 through 44: one baseline or aged 36 and older: annually. N/A         Screening Pap Tests and Pelvic Examination (up to age 79 and after 79 if unknown history or abnormal study last 10 years) Every 24 months except high risk Annually if at high risk for developing cervical or vaginal cancer, or childbearing, age with abnormal Pap test within past 3 years or every 2 years for women at normal risk. N/A         Ultrasound Screening for Abdominal Aortic Aneurysm (AAA) (once) Patient must be referred through IPPE and not have had a screening for abdominal aortic aneurysm before under Medicare. Limited to patients who meet one of the following criteria:  - Men who are 73-68 years old and have smoked more than 100 cigarettes in their lifetime.  -Anyone with a FH of AAA  -Anyone recommended for screening by USPSTF Once in a lifetime. N/A           Schedule of Personalized Health Plan  (Provide Copy to Patient)  The best way to stay healthy is to live a healthy lifestyle. A healthy lifestyle includes regular exercise, eating a well-balanced diet, keeping a healthy weight and not smoking.     Regular physical exams and screening tests are another important way to take care of yourself. Preventive exams provided by health care providers can find health problems early when treatment works best and can keep you from getting certain diseases or illnesses. Preventive services include exams, lab tests, screenings, shots, monitoring and information to help you take care of your own health. All people over 65 should have a pneumonia shot. Pneumonia shots are usually only needed once in a lifetime unless your doctor decides differently. All people over 65 should have a yearly flu shot. People over 65 are at medium to high risk for Hepatitis B. Three shots are needed for complete protection. In addition to your physical exam, some screening tests are recommended:    Bone mass measurement (dexa scan) is recommended every two years if you have certain risk factors, such as personal history of vertebral fracture or chronic steroid medication use    Diabetes Mellitus screening is recommended every year. Glaucoma is an eye disease caused by high pressure in the eye. An eye exam is recommended every year. Cardiovascular screening tests that check your cholesterol and other blood fat (lipid) levels are recommended every five years. Colorectal Cancer screening tests help to find pre-cancerous polyps (growths in the colon) so they can be removed before they turn into cancer. Tests ordered for screening depend on your personal and family history risk factors.     Screening for Prostate Cancer is recommended yearly with a digital rectal exam and/or a PSA test    Here is a list of your current Health Maintenance items with a due date:  Health Maintenance   Topic Date Due    GLAUCOMA SCREENING Q2Y  07/22/2017    MEDICARE YEARLY EXAM  02/10/2018    COLONOSCOPY  11/10/2018    DTaP/Tdap/Td series (2 - Td) 07/12/2022    ZOSTER VACCINE AGE 60>  Completed    Pneumococcal 65+ Low/Medium Risk  Completed    INFLUENZA AGE 9 TO ADULT  Completed Preventing Falls: Care Instructions  Your Care Instructions  Getting around your home safely can be a challenge if you have injuries or health problems that make it easy for you to fall. Loose rugs and furniture in walkways are among the dangers for many older people who have problems walking or who have poor eyesight. People who have conditions such as arthritis, osteoporosis, or dementia also have to be careful not to fall. You can make your home safer with a few simple measures. Follow-up care is a key part of your treatment and safety. Be sure to make and go to all appointments, and call your doctor if you are having problems. It's also a good idea to know your test results and keep a list of the medicines you take. How can you care for yourself at home? Taking care of yourself  · You may get dizzy if you do not drink enough water. To prevent dehydration, drink plenty of fluids, enough so that your urine is light yellow or clear like water. Choose water and other caffeine-free clear liquids. If you have kidney, heart, or liver disease and have to limit fluids, talk with your doctor before you increase the amount of fluids you drink. · Exercise regularly to improve your strength, muscle tone, and balance. Walk if you can. Swimming may be a good choice if you cannot walk easily. · Have your vision and hearing checked each year or any time you notice a change. If you have trouble seeing and hearing, you might not be able to avoid objects and could lose your balance. · Know the side effects of the medicines you take. Ask your doctor or pharmacist whether the medicines you take can affect your balance. Sleeping pills or sedatives can affect your balance. · Limit the amount of alcohol you drink. Alcohol can impair your balance and other senses. · Ask your doctor whether calluses or corns on your feet need to be removed.  If you wear loose-fitting shoes because of calluses or corns, you can lose your balance and fall. · Talk to your doctor if you have numbness in your feet. Preventing falls at home  · Remove raised doorway thresholds, throw rugs, and clutter. Repair loose carpet or raised areas in the floor. · Move furniture and electrical cords to keep them out of walking paths. · Use nonskid floor wax, and wipe up spills right away, especially on ceramic tile floors. · If you use a walker or cane, put rubber tips on it. If you use crutches, clean the bottoms of them regularly with an abrasive pad, such as steel wool. · Keep your house well lit, especially [place], and outside walkways. Use night-lights in areas such as hallways and bathrooms. Add extra light switches or use remote switches (such as switches that go on or off when you clap your hands) to make it easier to turn lights on if you have to get up during the night. · Install sturdy handrails on stairways. · Move items in your cabinets so that the things you use a lot are on the lower shelves (about waist level). · Keep a cordless phone and a flashlight with new batteries by your bed. If possible, put a phone in each of the main rooms of your house, or carry a cell phone in case you fall and cannot reach a phone. Or, you can wear a device around your neck or wrist. You push a button that sends a signal for help. · Wear low-heeled shoes that fit well and give your feet good support. Use footwear with nonskid soles. Check the heels and soles of your shoes for wear. Repair or replace worn heels or soles. · Do not wear socks without shoes on wood floors. · Walk on the grass when the sidewalks are slippery. If you live in an area that gets snow and ice in the winter, sprinkle salt on slippery steps and sidewalks. Preventing falls in the bath  · Install grab bars and nonskid mats inside and outside your shower or tub and near the toilet and sinks. · Use shower chairs and bath benches.   · Use a hand-held shower head that will allow you to sit while showering. · Get into a tub or shower by putting the weaker leg in first. Get out of a tub or shower with your strong side first.  · Repair loose toilet seats and consider installing a raised toilet seat to make getting on and off the toilet easier. · Keep your bathroom door unlocked while you are in the shower. Where can you learn more? Go to http://hector-scott.info/. Enter [id number] [id number] in the search box to learn more about \"Preventing Falls: Care Instructions. \"  Current as of: August 4, 2016  Content Version: 11.1  © 0384-0147 [employer]. Care instructions adapted under license by [employer] (which disclaims liability or warranty for this information). If you have questions about a medical condition or this instruction, always ask your healthcare professional. [place] [address] any warranty or liability for your use of this information. Hyperlipidemia: After Your Visit  Your Care Instructions  Hyperlipidemia is too much fat in your blood. The body has several kinds of fat, including cholesterol and triglycerides. Your body needs fat for many things, such as making new cells. But too much fat in your blood increases your chances of having a heart attack or stroke. You may be able to lower your cholesterol and triglycerides with a heart-healthy diet, exercise, and if needed, medicine. Your doctor may want you to try lifestyle changes first to see whether they lower the fat in your blood. You may need to take medicine if lifestyle changes do not lower the fat in your blood enough. Follow-up care is a key part of your treatment and safety. Be sure to make and go to all appointments, and call your doctor if you are having problems. Its also a good idea to know your test results and keep a list of the medicines you take. How can you care for yourself at home? Take your medicines  · Take your medicines exactly as prescribed.  Call your doctor if you think you are having a problem with your medicine. · If you take medicine to lower your cholesterol, go to follow-up visits. You will need to have blood tests. · Do not take large doses of niacin, which is a B vitamin, while taking medicine called statins. It may increase the chance of muscle pain and liver problems. · Talk to your doctor about avoiding grapefruit juice if you are taking statins. Grapefruit juice can raise the level of this medicine in your blood. This could increase side effects. Eat more fruits, vegetables, and fiber  · Fruits and vegetables have lots of nutrients that help protect against heart disease, and they have littleif anyfat. Try to eat at least five servings a day. Dark green, deep orange, or yellow fruits and vegetables are healthy choices. · Keep carrots, celery, and other veggies handy for snacks. Buy fruit that is in season and store it where you can see it so that you will be tempted to eat it. Cook dishes that have a lot of veggies in them, such as stir-fries and soups. · Foods high in fiber may reduce your cholesterol and provide important vitamins and minerals. High-fiber foods include whole-grain cereals and breads, oatmeal, beans, brown rice, citrus fruits, and apples. · Buy whole-grain breads and cereals instead of white bread and pastries. Limit saturated fat  · Read food labels and try to avoid saturated fat and trans fat. They increase your risk of heart disease. · Use olive or canola oil when you cook. Try cholesterol-lowering spreads, such as Benecol or Take Control. · Bake, broil, grill, or steam foods instead of frying them. · Limit the amount of high-fat meats you eat, including hot dogs and sausages. Cut out all visible fat when you prepare meat. · Eat fish, skinless poultry, and soy products such as tofu instead of high-fat meats. Soybeans may be especially good for your heart. Eat at least two servings of fish a week.  Certain fish, such as salmon, contain omega-3 fatty acids, which may help reduce your risk of heart attack. · Choose low-fat or fat-free milk and dairy products. Get exercise, limit alcohol, and quit smoking  · Get more exercise. Work with your doctor to set up an exercise program. Even if you can do only a small amount, exercise will help you get stronger, have more energy, and manage your weight and your stress. Walking is an easy way to get exercise. Gradually increase the amount you walk every day. Aim for at least 30 minutes on most days of the week. You also may want to swim, bike, or do other activities. · Limit alcohol to no more than 2 drinks a day for men and 1 drink a day for women. · Do not smoke. If you need help quitting, talk to your doctor about stop-smoking programs and medicines. These can increase your chances of quitting for good. When should you call for help? Call 911 anytime you think you may need emergency care. For example, call if:  · You have symptoms of a heart attack. These may include:  ¨ Chest pain or pressure, or a strange feeling in the chest.  ¨ Sweating. ¨ Shortness of breath. ¨ Nausea or vomiting. ¨ Pain, pressure, or a strange feeling in the back, neck, jaw, or upper belly or in one or both shoulders or arms. ¨ Lightheadedness or sudden weakness. ¨ A fast or irregular heartbeat. After you call 911, the  may tell you to chew 1 adult-strength or 2 to 4 low-dose aspirin. Wait for an ambulance. Do not try to drive yourself. · You have signs of a stroke. These may include:  ¨ Sudden numbness, paralysis, or weakness in your face, arm, or leg, especially on only one side of your body. ¨ New problems with walking or balance. ¨ Sudden vision changes. ¨ Drooling or slurred speech. ¨ New problems speaking or understanding simple statements, or feeling confused. ¨ A sudden, severe headache that is different from past headaches. · You passed out (lost consciousness).   Call your doctor now or seek immediate medical care if:  · You have muscle pain or weakness. Watch closely for changes in your health, and be sure to contact your doctor if:  · You are very tired. · You have an upset stomach, gas, constipation, or belly pain or cramps. Where can you learn more? Go to [employer].be  Enter C406 in the search box to learn more about \"Hyperlipidemia: After Your Visit. \"   © 1410-1226 [employer]. Care instructions adapted under license by [employer] (which disclaims liability or warranty for this information). This care instruction is for use with your licensed healthcare professional. If you have questions about a medical condition or this instruction, always ask your healthcare professional. [address] any warranty or liability for your use of this information.   Content Version: 9.6.743284; Last Revised: October 13, 2011 20

## 2024-05-31 NOTE — CARE COORDINATION ASSESSMENT. - OTHER PERTINENT REFERRAL INFORMATION
Sw meet with Pt at bedside. Pt is A & O x4. Pt states he was Indep PTA with uses of walker. Pt has 2 MAKAYLA and 15 inside. Pt has raised toliet seat, and cane inside. Pt lives with spouse. NO extra assistance at home. If requested for Pt to go to [facility], but does not want it and would prefer [facility].

## 2024-05-31 NOTE — PATIENT PROFILE ADULT - PUBLIC BENEFITS
Patient Education     Recognizing Skin Cancer  Doing monthly skin checkups is the best way to find new marks or skin changes. During your skin checkups, be sure to follow the ABCDEs of skin checks. This means checking moles or other growths for Asymmetry, Border, Color, Diameter, and Evolving (changing). Note, too, any new growths, or, if any of your growths bleed, itch, or are painful.  The ABCDEs of skin checks  Check your moles or growths for signs of melanoma using ABCDE:  · Asymmetry: the sides of the mole or growth don’t match  · Border: the edges are ragged, notched, or blurred  · Color: the color within the mole or growth varies  · Diameter: the mole or growth is larger than 6 mm (size of a pencil eraser)  · Evolving: the size, shape, or color of the mole or growth is changing (evolving is not shown below.)       In addition to the ABCDEs, other warning signs of skin cancer include:  · A spot or mole that looks different from all other marks on your skin  · Changes in how an area feels, such as itching, tenderness, or pain  · Changes in the skin's surface, such as oozing, bleeding, or scaliness  · A sore that does not heal  · New swelling or redness beyond the border of a mole  Who’s at risk?  Anyone can get skin cancer. But you are at greater risk if you have:  · Fair skin, light-colored hair, or light-colored eyes  · Many moles or abnormal moles on your skin  · A history of sunburns from sunlight or tanning beds  · A family history of skin cancer  · A history of exposure to radiation or chemicals  · A weakened immune system  Also, a personal history of skin cancer puts you at risk for recurring skin cancer.  How to check your skin  Do your monthly skin checkups in front of a full-length mirror. Check all parts of your body, including your:  · Head (ears, face, neck, and scalp)  · Torso (front, back, and sides)  · Arms (tops, undersides, upper, and lower armpits)  · Hands (palms, backs, and fingers,  including under the nails)  · Buttocks and genitals  · Legs (front, back, and sides)  · Feet (tops, soles, toes, including under the nails, and between toes)  If you have a lot of moles, take digital photos of them each month. Make sure to take photos both up close and from a distance. These can help you see if any moles change over time.  When to seek medical treatment  Most skin changes are not cancer. But if you see any changes in your skin, call your doctor right away. Only he or she can diagnose a problem. If you have skin cancer, seeing your doctor can be the first step toward getting the treatment that could save your life.   © 2884-2607 [employer] [address] [place] [address], [place] [address]. All rights reserved. This information is not intended as a substitute for professional medical care. Always follow your healthcare professional's instructions.            no

## 2024-05-31 NOTE — ED PROVIDER NOTE - PHYSICAL EXAMINATION
4/5 strength bilateral lower extremities, significant weakness with dorsiflexion of the feet, proximal lifting of his legs.  Normal distal sensation equal bilaterally.  Diminished reflexes at the knee and ankle.  Normal cap refill, 2+ pulses.

## 2024-05-31 NOTE — PATIENT CHOICE NOTE. - NSPTCHOICESTATE_GEN_ALL_CORE

## 2024-05-31 NOTE — ED PROVIDER NOTE - CLINICAL SUMMARY MEDICAL DECISION MAKING FREE TEXT BOX
Acutely worse generalized weakness in the bilateral lower extremities, with a known history of spinal stenosis.  Will check labs, x-ray, MRI, admission, steroids

## 2024-05-31 NOTE — ED PROVIDER NOTE - OBJECTIVE STATEMENT
78-year-old male with a history of spinal stenosis presents with has had mild chronic weakness for the past several years in his lower extremities.  However over the past 1 week he has had significant increase in his weakness in the legs, now having difficulty ambulating.  Over the past couple days he has been using a walker, and now he can barely lift his legs.  No sensory changes to his legs.  No focal weakness.  No numbness or tingling.  No acute abdominal pain.  No chest pain or shortness of breath.  No upper extremity weakness.  No acute headache.  No neck pain.  No acute back pain.  No aggravating or alleviating factors otherwise noted.  No other acute injury or complaints.  Patient was seen today by his orthopedist Dr. Richards, and sent to the ED for admission and MRI

## 2024-05-31 NOTE — H&P ADULT - ASSESSMENT
77yo M [facility] spinal stenosis presents to ED with bilateral lower extremity weakness. Admitted for bilateral lower extremity weakness, need for MRI for further evaluation.  77yo M [facility] spinal stenosis presents to ED with bilateral lower extremity weakness. Admitted for bilateral lower extremity weakness, need for MRI for further evaluation.

## 2024-05-31 NOTE — H&P ADULT - NSHPSOCIALHISTORY_GEN_ALL_CORE
Marital Status:   Living Situation:   ADLs/Mobility: using a walker, wheelchair lately due to inability to ambulate  Occupation:   Tobacco Use: former smoker, quit ____  Alcohol Use:   Drug Use: Marital Status:   Living Situation: lives with wife at home  ADLs/Mobility: using a walker, wheelchair lately due to inability to ambulate  Occupation:   Tobacco Use: former smoker, quit 27 years ago  Alcohol Use: very rarely, once a year  Drug Use: denies

## 2024-05-31 NOTE — ED ADULT TRIAGE NOTE - CHIEF COMPLAINT QUOTE
I cannot walk or stand (coming on for over a week, getting worse each day) (NOTE: patient is wearing 2 bracelets from previous visits to other hospitals and does not want them removed)

## 2024-05-31 NOTE — ED ADULT NURSE REASSESSMENT NOTE - NS ED NURSE REASSESS COMMENT FT1
1320: Bladder scan done, urine >650 cc. Dr. Reynolds aware of the amt of urine and that the pt refused to be straight cath.

## 2024-05-31 NOTE — H&P ADULT - NSICDXPASTMEDICALHX_GEN_ALL_CORE_FT
PAST MEDICAL HISTORY:  Fracture of left clavicle     Spinal stenosis      PAST MEDICAL HISTORY:  Anxiety     Fracture of left clavicle     Spinal stenosis

## 2024-05-31 NOTE — H&P ADULT - HISTORY OF PRESENT ILLNESS
79yo M PM[facility] spinal stenosis, presents to ED with bilateral lower extremity weakness. Patient was seen by orthopedics Dr. Richards earlier today and sent to the ED for further evaluation.     In the ED  Vitals: T 97.7F, HR 94, /79, RR 18, Sat 99% on RA  Labs: CBC within normal, BUN elevated 27, Cr 1.3 Glucose 102, eGFR low 56  Imaging:   MRI ordered cervical spine NC, lumbar spine NC, thoracic spine NC  CXR official read pending  EKG NSR, VR 83, QT/QTc 378/444  Received 1L NS bolus x1   79yo M PMH spinal stenosis, L clavical fx in 12/2023, anxiety presenting to the ED sent in by Dr. Richards for worsening bilateral lower extremity pain. Pt has multi-level stenosis and has been experiencing mild weakness in his legs for the past few years, which has gotten acutely worse within the last week. Pt endorses immediate leg heaviness and fatigue on ambulation, with frequent cramps. Has been going to PT that  has been managing symptoms up until recently. Denies any trauma to the area. Denies back pain, chest pain, fevers, chills, tingling or numbness in the lower extremities, confusion, or vision changes.       In the ED  Vitals: T 97.7F, HR 94, /79, RR 18, Sat 99% on RA  Labs: CBC within normal, BUN elevated 27, Cr 1.3 Glucose 102, eGFR low 56  Imaging:   MRI ordered cervical spine NC, lumbar spine NC, thoracic spine NC  CXR official read pending  EKG NSR, VR 83, QT/QTc 378/444  Received 1L NS bolus x1 77yo M PMH spinal stenosis, L clavical fx in 12/2023, anxiety presenting to the ED sent in by Dr. Richards for worsening bilateral lower extremity pain. Pt has multi-level stenosis and has been experiencing mild weakness in his legs for the past few years, which has gotten acutely worse within the last week. Pt endorses immediate leg heaviness and fatigue on ambulation, with frequent cramps. Has been going to PT that  has been managing symptoms up until recently. Denies any trauma to the area. Denies back pain, chest pain, fevers, chills, tingling or numbness in the lower extremities, confusion, or vision changes.       In the ED  Vitals: T 97.7F, HR 94, /79, RR 18, Sat 99% on RA  Labs: CBC within normal, BUN elevated 27, Cr 1.3 Glucose 102, eGFR low 56  Imaging:   - MRI CERVICAL SPINE: Multilevel degenerative changes. C2-C3 ken[place] spinal cord compression without abnormal intrinsic cord signal. Remaining levels demonstrate predominantly mild spinal canal stenosis. Multilevel neural foraminal narrowing: C2-C3 mild to moderate left neural foraminal narrowing, C3-C4 marked bilateral neural foraminal narrowing, C4-C5 moderate left neural foraminal narrowing, C5-C6 moderate to marked right and mild to moderate left neural foraminal narrowing, C6-C7 moderate to marked right and moderate left neural foraminal narrowing.  - MRI THORACIC SPINE: Multilevel degenerative disc disease resulting in mild multilevel spinal  canal stenosis. Moderate left neural foraminal narrowing at T9 and T11-T12.  - MRI LUMBAR SPINE: Multilevel degenerative changes. L2-L3 marked thecal sac compression, L3-L4 moderate thecal sac compression with marked right and moderate left lateral recess stenosis, L4-L5 marked thecal sac compression, L5-S1 mass effect upon the bilateral descending S1 nerve roots. L3-L4 moderate right neural foraminal narrowing, L4-L5 moderate right neural foraminal narrowing, L5-S1 moderate right and moderate to severe left neural foraminal narrowing.  CXR official read pending  EKG NSR, VR 83, QT/QTc 378/444  Received 1L NS bolus x1 77yo M PMH spinal stenosis, L clavical fx in 12/2023, anxiety presenting to the ED sent in by Dr. Richards for worsening bilateral lower extremity pain. Pt has multi-level stenosis and has been experiencing mild weakness in his legs for the past few years, which has gotten acutely worse within the last week. Pt endorses immediate leg heaviness and fatigue on ambulation, with frequent cramps. Has been going to PT that  has been managing symptoms up until recently. Denies any trauma to the area. Denies back pain, chest pain, fevers, chills, tingling or numbness in the lower extremities, confusion, or vision changes.   Patient denies any past medical history of DM2/CHF/TIA/CVA/CAD/MI.   Denies any family history of adverse reactions to anesthesia.       In the ED  Vitals: T 97.7F, HR 94, /79, RR 18, Sat 99% on RA  Labs: CBC within normal, BUN elevated 27, Cr 1.3 Glucose 102, eGFR low 56  Imaging:   - MRI CERVICAL SPINE: Multilevel degenerative changes. C2-C3 [place]en[place] spinal cord compression without abnormal intrinsic cord signal. Remaining levels demonstrate predominantly mild spinal canal stenosis. Multilevel neural foraminal narrowing: C2-C3 mild to moderate left neural foraminal narrowing, C3-C4 marked bilateral neural foraminal narrowing, C4-C5 moderate left neural foraminal narrowing, C5-C6 moderate to marked right and mild to moderate left neural foraminal narrowing, C6-C7 moderate to marked right and moderate left neural foraminal narrowing.  - MRI THORACIC SPINE: Multilevel degenerative disc disease resulting in mild multilevel spinal  canal stenosis. Moderate left neural foraminal narrowing at T9 and T11-T12.  - MRI LUMBAR SPINE: Multilevel degenerative changes. L2-L3 marked thecal sac compression, L3-L4 moderate thecal sac compression with marked right and moderate left lateral recess stenosis, L4-L5 marked thecal sac compression, L5-S1 mass effect upon the bilateral descending S1 nerve roots. L3-L4 moderate right neural foraminal narrowing, L4-L5 moderate right neural foraminal narrowing, L5-S1 moderate right and moderate to severe left neural foraminal narrowing.  CXR official read pending  EKG NSR, VR 83, QT/QTc 378/444  Received 1L NS bolus x1

## 2024-05-31 NOTE — PATIENT PROFILE ADULT - SURGICAL SITE DESCRIPTION
Vita Bingham was seen and treated in our emergency department on 2/28/2022  Diagnosis:     [place]    She may return on this date: If you have any questions or concerns, please don't hesitate to call        Carole Eaton MD    ______________________________           _______________          _______________  Hospital Representative                              Date                                Time cervical incision s/p spinal fusion

## 2024-05-31 NOTE — ED ADULT NURSE NOTE - OBJECTIVE STATEMENT
Pt presents to the ED s/p inability to stand/ walk since last Monday. Pt is very angry, soiled with urine and refused for the Rn to assist with changing into a gown. Pt refused to have the RN assess his skin. Pt states" I do not have any open wounds on my backside."

## 2024-06-01 LAB
ALBUMIN SERPL ELPH-MCNC: 3.5 G/DL — SIGNIFICANT CHANGE UP (ref 3.3–5)
ALP SERPL-CCNC: 65 U/L — SIGNIFICANT CHANGE UP (ref 40–120)
ALT FLD-CCNC: 31 U/L — SIGNIFICANT CHANGE UP (ref 12–78)
ANION GAP SERPL CALC-SCNC: 7 MMOL/L — SIGNIFICANT CHANGE UP (ref 5–17)
AST SERPL-CCNC: 33 U/L — SIGNIFICANT CHANGE UP (ref 15–37)
BASOPHILS # BLD AUTO: 0.02 K/UL — SIGNIFICANT CHANGE UP (ref 0–0.2)
BASOPHILS NFR BLD AUTO: 0.2 % — SIGNIFICANT CHANGE UP (ref 0–2)
BILIRUB SERPL-MCNC: 0.5 MG/DL — SIGNIFICANT CHANGE UP (ref 0.2–1.2)
BUN SERPL-MCNC: 33 MG/DL — HIGH (ref 7–23)
CALCIUM SERPL-MCNC: 8.7 MG/DL — SIGNIFICANT CHANGE UP (ref 8.5–10.1)
CHLORIDE SERPL-SCNC: 107 MMOL/L — SIGNIFICANT CHANGE UP (ref 96–108)
CO2 SERPL-SCNC: 23 MMOL/L — SIGNIFICANT CHANGE UP (ref 22–31)
CREAT SERPL-MCNC: 1.3 MG/DL — SIGNIFICANT CHANGE UP (ref 0.5–1.3)
EGFR: 56 ML/MIN/1.73M2 — LOW
EOSINOPHIL # BLD AUTO: 0 K/UL — SIGNIFICANT CHANGE UP (ref 0–0.5)
EOSINOPHIL NFR BLD AUTO: 0 % — SIGNIFICANT CHANGE UP (ref 0–6)
GLUCOSE SERPL-MCNC: 184 MG/DL — HIGH (ref 70–99)
HCT VFR BLD CALC: 40.8 % — SIGNIFICANT CHANGE UP (ref 39–50)
HGB BLD-MCNC: 13.2 G/DL — SIGNIFICANT CHANGE UP (ref 13–17)
IMM GRANULOCYTES NFR BLD AUTO: 0.4 % — SIGNIFICANT CHANGE UP (ref 0–0.9)
LYMPHOCYTES # BLD AUTO: 0.56 K/UL — LOW (ref 1–3.3)
LYMPHOCYTES # BLD AUTO: 7 % — LOW (ref 13–44)
MCHC RBC-ENTMCNC: 28.5 PG — SIGNIFICANT CHANGE UP (ref 27–34)
MCHC RBC-ENTMCNC: 32.4 GM/DL — SIGNIFICANT CHANGE UP (ref 32–36)
MCV RBC AUTO: 88.1 FL — SIGNIFICANT CHANGE UP (ref 80–100)
MONOCYTES # BLD AUTO: 0.11 K/UL — SIGNIFICANT CHANGE UP (ref 0–0.9)
MONOCYTES NFR BLD AUTO: 1.4 % — LOW (ref 2–14)
NEUTROPHILS # BLD AUTO: 7.33 K/UL — SIGNIFICANT CHANGE UP (ref 1.8–7.4)
NEUTROPHILS NFR BLD AUTO: 91 % — HIGH (ref 43–77)
NRBC # BLD: 0 /100 WBCS — SIGNIFICANT CHANGE UP (ref 0–0)
PLATELET # BLD AUTO: 334 K/UL — SIGNIFICANT CHANGE UP (ref 150–400)
POTASSIUM SERPL-MCNC: 5 MMOL/L — SIGNIFICANT CHANGE UP (ref 3.5–5.3)
POTASSIUM SERPL-SCNC: 5 MMOL/L — SIGNIFICANT CHANGE UP (ref 3.5–5.3)
PROT SERPL-MCNC: 7.4 G/DL — SIGNIFICANT CHANGE UP (ref 6–8.3)
RBC # BLD: 4.63 M/UL — SIGNIFICANT CHANGE UP (ref 4.2–5.8)
RBC # FLD: 13.6 % — SIGNIFICANT CHANGE UP (ref 10.3–14.5)
SODIUM SERPL-SCNC: 137 MMOL/L — SIGNIFICANT CHANGE UP (ref 135–145)
WBC # BLD: 8.05 K/UL — SIGNIFICANT CHANGE UP (ref 3.8–10.5)
WBC # FLD AUTO: 8.05 K/UL — SIGNIFICANT CHANGE UP (ref 3.8–10.5)

## 2024-06-01 PROCEDURE — 99233 SBSQ HOSP IP/OBS HIGH 50: CPT

## 2024-06-01 PROCEDURE — 70450 CT HEAD/BRAIN W/O DYE: CPT | Mod: 26,XU

## 2024-06-01 PROCEDURE — 72125 CT NECK SPINE W/O DYE: CPT | Mod: 26

## 2024-06-01 PROCEDURE — 70496 CT ANGIOGRAPHY HEAD: CPT | Mod: 26

## 2024-06-01 PROCEDURE — 70498 CT ANGIOGRAPHY NECK: CPT | Mod: 26

## 2024-06-01 RX ORDER — DEXAMETHASONE 0.5 MG/5ML
10 ELIXIR ORAL EVERY 8 HOURS
Refills: 0 | Status: DISCONTINUED | OUTPATIENT
Start: 2024-06-01 | End: 2024-06-04

## 2024-06-01 RX ORDER — PANTOPRAZOLE SODIUM 20 MG/1
40 TABLET, DELAYED RELEASE ORAL
Refills: 0 | Status: DISCONTINUED | OUTPATIENT
Start: 2024-06-01 | End: 2024-06-04

## 2024-06-01 RX ORDER — CALCIUM CARBONATE 500(1250)
1 TABLET ORAL THREE TIMES A DAY
Refills: 0 | Status: DISCONTINUED | OUTPATIENT
Start: 2024-06-01 | End: 2024-06-04

## 2024-06-01 RX ORDER — LANOLIN ALCOHOL/MO/W.PET/CERES
3 CREAM (GRAM) TOPICAL ONCE
Refills: 0 | Status: COMPLETED | OUTPATIENT
Start: 2024-06-01 | End: 2024-06-01

## 2024-06-01 RX ORDER — ONDANSETRON 8 MG/1
4 TABLET, FILM COATED ORAL ONCE
Refills: 0 | Status: COMPLETED | OUTPATIENT
Start: 2024-06-01 | End: 2024-06-01

## 2024-06-01 RX ADMIN — Medication 1 TABLET(S): at 14:43

## 2024-06-01 RX ADMIN — CYCLOBENZAPRINE HYDROCHLORIDE 10 MILLIGRAM(S): 10 TABLET, FILM COATED ORAL at 00:05

## 2024-06-01 RX ADMIN — Medication 3 MILLIGRAM(S): at 01:38

## 2024-06-01 RX ADMIN — Medication 1 TABLET(S): at 21:58

## 2024-06-01 RX ADMIN — Medication 1 SPRAY(S): at 09:56

## 2024-06-01 RX ADMIN — Medication 102 MILLIGRAM(S): at 14:43

## 2024-06-01 RX ADMIN — ONDANSETRON 4 MILLIGRAM(S): 8 TABLET, FILM COATED ORAL at 14:46

## 2024-06-01 RX ADMIN — Medication 102 MILLIGRAM(S): at 05:52

## 2024-06-01 RX ADMIN — GABAPENTIN 300 MILLIGRAM(S): 400 CAPSULE ORAL at 05:52

## 2024-06-01 RX ADMIN — GABAPENTIN 300 MILLIGRAM(S): 400 CAPSULE ORAL at 18:59

## 2024-06-01 RX ADMIN — PANTOPRAZOLE SODIUM 40 MILLIGRAM(S): 20 TABLET, DELAYED RELEASE ORAL at 05:59

## 2024-06-01 RX ADMIN — DULOXETINE HYDROCHLORIDE 60 MILLIGRAM(S): 30 CAPSULE, DELAYED RELEASE ORAL at 09:56

## 2024-06-01 RX ADMIN — Medication 102 MILLIGRAM(S): at 21:58

## 2024-06-01 NOTE — PROGRESS NOTE ADULT - SUBJECTIVE AND OBJECTIVE BOX
Patient is a 78y old  Male who presents with a chief complaint of lower extremity weakness, inability to walk (01 Jun 2024 07:33)    INTERVAL HPI/OVERNIGHT EVENTS: Patient was seen and examined. eating breakfast. Reports feeling better. denies any chest pain or dypsnea. afebrile.     I&O's Summary    31 May 2024 07:01  -  01 Jun 2024 07:00  --------------------------------------------------------  IN: 0 mL / OUT: 500 mL / NET: -500 mL        LABS:                        13.2   8.05  )-----------( 334      ( 01 Jun 2024 06:27 )             40.8     05-31    138  |  107  |  27<H>  ----------------------------<  102<H>  4.3   |  26  |  1.30    Ca    8.9      31 May 2024 12:50    TPro  7.7  /  Alb  3.7  /  TBili  0.5  /  DBili  x   /  AST  31  /  ALT  33  /  AlkPhos  67  05-31    PT/INR - ( 31 May 2024 12:50 )   PT: 10.9 sec;   INR: 0.93 ratio         PTT - ( 31 May 2024 12:50 )  PTT:31.8 sec  Urinalysis Basic - ( 31 May 2024 12:50 )    Color: x / Appearance: x / SG: x / pH: x  Gluc: 102 mg/dL / Ketone: x  / Bili: x / Urobili: x   Blood: x / Protein: x / Nitrite: x   Leuk Esterase: x / RBC: x / WBC x   Sq Epi: x / Non Sq Epi: x / Bacteria: x      CAPILLARY BLOOD GLUCOSE            Urinalysis Basic - ( 31 May 2024 12:50 )    Color: x / Appearance: x / SG: x / pH: x  Gluc: 102 mg/dL / Ketone: x  / Bili: x / Urobili: x   Blood: x / Protein: x / Nitrite: x   Leuk Esterase: x / RBC: x / WBC x   Sq Epi: x / Non Sq Epi: x / Bacteria: x        MEDICATIONS  (STANDING):  DULoxetine 60 milliGRAM(s) Oral daily  fluticasone propionate 50 MICROgram(s)/spray Nasal Spray 1 Spray(s) Both Nostrils <User Schedule>  gabapentin 300 milliGRAM(s) Oral two times a day  pantoprazole    Tablet 40 milliGRAM(s) Oral before breakfast  polyethylene glycol 3350 17 Gram(s) Oral daily  senna 2 Tablet(s) Oral at bedtime  sodium chloride 0.9%. 1000 milliLiter(s) (75 mL/Hr) IV Continuous <Continuous>    MEDICATIONS  (PRN):  acetaminophen     Tablet .. 650 milliGRAM(s) Oral every 6 hours PRN Temp greater or equal to 38C (100.4F), Mild Pain (1 - 3)  cyclobenzaprine 10 milliGRAM(s) Oral three times a day PRN Muscle Spasm  oxyCODONE    IR 5 milliGRAM(s) Oral every 6 hours PRN Moderate Pain (4 - 6)      REVIEW OF SYSTEMS:  CONSTITUTIONAL: No fever or chills  HEENT:  No headache  RESPIRATORY: No cough or shortness of breath  CARDIOVASCULAR: No chest pain  GASTROINTESTINAL: No abdominal pain, nausea, vomiting, or diarrhea  GENITOURINARY: No dysuria  NEUROLOGICAL: no focal weakness  MUSCULOSKELETAL: no myalgias  PSYCH: no recent changes in mood    RADIOLOGY & ADDITIONAL TESTS:    Imaging Personally Reviewed:  [x] YES  [ ] NO    Consultant(s) Notes Reviewed:  [x] YES  [ ] NO    PHYSICAL EXAM:  T(C): 37 (06-01-24 @ 04:42), Max: 37.1 (05-31-24 @ 16:23)  HR: 96 (06-01-24 @ 04:42) (87 - 108)  BP: 167/80 (06-01-24 @ 06:47) (128/79 - 185/84)  RR: 19 (06-01-24 @ 04:42) (16 - 19)  SpO2: 96% (06-01-24 @ 04:42) (95% - 99%)    GENERAL: awake, alert  HEENT:  anicteric, moist mucous membranes  CHEST/LUNG:  CTA b/l, no rales, wheezes, or rhonchi  HEART:  RRR, S1, S2  ABDOMEN:  BS+, soft, nontender, nondistended  EXTREMITIES: no edema  NERVOUS SYSTEM: answers questions and follows commands appropriately  PSYCH: normal affect    Care Discussed with Consultants/Other Providers [x] YES  [ ] NO

## 2024-06-01 NOTE — PROGRESS NOTE ADULT - ASSESSMENT
77yo M PMH spinal stenosis presents to ED with bilateral lower extremity weakness. Admitted for bilateral lower extremity weakness, need for MRI for further evaluation.     BL lower extremity weakness     - MRI reviewed.     - ortho eval appreciated. Possible OR tomorrow if needed.     - cont decadron/gabapentin/flexeril    - PT/OT when ok with ortho     Anxiety     - cont duloxetine     - not using klonopin at home     elevated BP    - will monitor for now. start BP meds if persistently elevated.     DVT proph: SCDs    Medically optimized for planned orthopedic procedure if indicated  No absolute contraindications  cardio clearance appreciated.   wife was updated over the phone    79yo M PMH spinal stenosis presents to ED with bilateral lower extremity weakness. Admitted for bilateral lower extremity weakness, need for MRI for further evaluation.     BL lower extremity weakness     - MRI reviewed.     - ortho eval appreciated. Possible OR Monday if needed.     - cont decadron/gabapentin/flexeril    - PT/OT discussed with ortho. ok to start at this time given improvement of symptoms.     Anxiety     - cont duloxetine     - not using klonopin at home     elevated BP    - will monitor for now. start BP meds if persistently elevated.     DVT proph: SCDs    Medically optimized for planned orthopedic procedure if indicated  No absolute contraindications  cardio clearance appreciated.   wife was updated over the phone

## 2024-06-01 NOTE — CONSULT NOTE ADULT - SUBJECTIVE AND OBJECTIVE BOX
History of Present Illness:    Past Medical/Surgical History:    Medications:    Family History: Non-contributory family history of premature cardiovascular atherosclerotic disease    Social History: No tobacco, alcohol or drug use    Review of Systems:  General: No fevers, chills, weight gain  Skin: No rashes, color changes  Cardiovascular: No chest pain, orthopnea  Respiratory: No shortness of breath, cough  Gastrointestinal: No nausea, abdominal pain  Genitourinary: No incontinence, pain with urination  Musculoskeletal: No pain, swelling, decreased range of motion  Neurological: No headache, weakness  Psychiatric: No depression, anxiety  Endocrine: No weight gain, increased thirst  All other systems are comprehensively negative.    Physical Exam:  Vitals:        Vital Signs Last 24 Hrs  T(C): 37 (01 Jun 2024 04:42), Max: 37.1 (31 May 2024 16:23)  T(F): 98.6 (01 Jun 2024 04:42), Max: 98.8 (31 May 2024 20:18)  HR: 96 (01 Jun 2024 04:42) (87 - 108)  BP: 167/80 (01 Jun 2024 06:47) (128/79 - 185/84)  BP(mean): --  RR: 19 (01 Jun 2024 04:42) (16 - 19)  SpO2: 96% (01 Jun 2024 04:42) (95% - 99%)    Parameters below as of 01 Jun 2024 04:42  Patient On (Oxygen Delivery Method): room air      General: NAD  HEENT: MMM  Neck: No JVD, no carotid bruit  Lungs: CTAB  CV: RRR, nl S1/S2, no M/R/G  Abdomen: S/NT/ND, +BS  Extremities: No LE edema, no cyanosis  Neuro: AAOx3, non-focal  Skin: No rash    Labs:                        14.2   8.96  )-----------( 273      ( 31 May 2024 12:50 )             44.2     05-31    138  |  107  |  27<H>  ----------------------------<  102<H>  4.3   |  26  |  1.30    Ca    8.9      31 May 2024 12:50    TPro  7.7  /  Alb  3.7  /  TBili  0.5  /  DBili  x   /  AST  31  /  ALT  33  /  AlkPhos  67  05-31        PT/INR - ( 31 May 2024 12:50 )   PT: 10.9 sec;   INR: 0.93 ratio         PTT - ( 31 May 2024 12:50 )  PTT:31.8 sec    ECG/Telemetry:      History of Present Illness: The patient is a 78 year old male with a history of anxiety, GERD, spinal stenosis who presents with worsening bilateral leg pain, heaviness. He denies chest pain or shortness of breath. No exertional symptoms. No recent cardiac testing.    Past Medical/Surgical History:  anxiety, GERD, spinal stenosis    Medications:  Home Medications:  clonazePAM 0.5 mg oral tablet: 1 tab(s) orally 3 times a day (31 May 2024 17:50)  diclofenac sodium 100 mg oral tablet, extended release: 1 tab(s) orally once a day (31 May 2024 13:31)  DULoxetine 60 mg oral delayed release capsule: 1 cap(s) orally once a day (31 May 2024 13:31)  fluticasone 50 mcg/inh nasal spray: 1 spray(s) in each nostril once a day (31 May 2024 13:31)      Family History: Non-contributory family history of premature cardiovascular atherosclerotic disease    Social History: No tobacco, alcohol or drug use    Review of Systems:  General: No fevers, chills, weight gain  Skin: No rashes, color changes  Cardiovascular: No chest pain, orthopnea  Respiratory: No shortness of breath, cough  Gastrointestinal: No nausea, abdominal pain  Genitourinary: No incontinence, pain with urination  Musculoskeletal: No pain, swelling, decreased range of motion  Neurological: No headache, weakness  Psychiatric: No depression, anxiety  Endocrine: No weight gain, increased thirst  All other systems are comprehensively negative.    Physical Exam:  Vitals:        Vital Signs Last 24 Hrs  T(C): 37 (01 Jun 2024 04:42), Max: 37.1 (31 May 2024 16:23)  T(F): 98.6 (01 Jun 2024 04:42), Max: 98.8 (31 May 2024 20:18)  HR: 96 (01 Jun 2024 04:42) (87 - 108)  BP: 167/80 (01 Jun 2024 06:47) (128/79 - 185/84)  BP(mean): --  RR: 19 (01 Jun 2024 04:42) (16 - 19)  SpO2: 96% (01 Jun 2024 04:42) (95% - 99%)  Parameters below as of 01 Jun 2024 04:42  Patient On (Oxygen Delivery Method): room air  General: NAD  HEENT: MMM  Neck: No JVD, no carotid bruit  Lungs: CTAB  CV: RRR, nl S1/S2, no M/R/G  Abdomen: S/NT/ND, +BS  Extremities: No LE edema, no cyanosis  Neuro: AAOx3, non-focal  Skin: No rash    Labs:                        14.2   8.96  )-----------( 273      ( 31 May 2024 12:50 )             44.2     05-31    138  |  107  |  27<H>  ----------------------------<  102<H>  4.3   |  26  |  1.30    Ca    8.9      31 May 2024 12:50    TPro  7.7  /  Alb  3.7  /  TBili  0.5  /  DBili  x   /  AST  31  /  ALT  33  /  AlkPhos  67  05-31        PT/INR - ( 31 May 2024 12:50 )   PT: 10.9 sec;   INR: 0.93 ratio         PTT - ( 31 May 2024 12:50 )  PTT:31.8 sec    ECG/Telemetry: NSR, LAD, no ST abnormality

## 2024-06-01 NOTE — PROGRESS NOTE ADULT - SUBJECTIVE AND OBJECTIVE BOX
Patient seen and examined at bedside. No acute complaints at this time. Pt endorses improvement in sx, notes he was able to stand and walk 1-2 steps to chair w/o assistance yesterday. Denies chest pain, shortness of breath, nausea or vomiting.     PE:  Vital Signs Last 24 Hrs  T(C): 37 (06-01-24 @ 04:42), Max: 37.1 (05-31-24 @ 16:23)  T(F): 98.6 (06-01-24 @ 04:42), Max: 98.8 (05-31-24 @ 20:18)  HR: 96 (06-01-24 @ 04:42) (87 - 108)  BP: 167/80 (06-01-24 @ 06:47) (128/79 - 185/84)  BP(mean): --  RR: 19 (06-01-24 @ 04:42) (16 - 19)  SpO2: 96% (06-01-24 @ 04:42) (95% - 99%)    General: NAD, resting comforatbly in bed  No midline or paraspinal TTP C/T/Lsp  No bony stepoffs  Neg colin  Neg Babinski  Neg Clonus  No Saddle Anesthesia  Passive/Active Rectal tone intact    Motor:                   C5                C6              C7               C8           T1   R             5/5                5/5            5/5              5/5          5/5  L             5/5                5/5            5/5              5/5          5/5                    L2                  L3             L4              L5            S1  R            5/5                4+/5             2/5*            0/5          5/5  L             5/5                5/5            4/5*            5/5          5/5    *BL Foot Drop - R DF To about 10-15 degrees below neutral, 2/5. L DF to about neutral, 5/5 at neutral    Sensory:            C5         C6         C7      C8       T1        (0=absent, 1=impaired, 2=normal, NT=not testable)  R         2            2           2        2         2  L          2            2           2        2         2               L2          L3         L4      L5       S1         (0=absent, 1=impaired, 2=normal, NT=not testable)  R         2            2            2        2        2  L          2            2           2        2         2        PT/INR - ( 31 May 2024 12:50 )   PT: 10.9 sec;   INR: 0.93 ratio         PTT - ( 31 May 2024 12:50 )  PTT:31.8 sec    A/P: 78M who presents with BLLE weakness    MRI Reviewed and discussed.   Decadron 10 mg q8, Gabapentin 300mg BID, Flexeril 10mg TID  Analgesia  WBAT  Hold DVT ppx until MRI results and final plan is decided on  PT/OT  Medical management appreciated. PLEASE DOCUMENT MEDICAL CLEARANCE FOR POSSIBLE OR TOMORROW.   Cardiac management appreciated. PLEASE DOCUMENT CARDIAC CLEARANCE FOR POSSIBLE OR TOMORROW.   Further Plan regarding OR pending discussion between Maurice and pt later today.   Discussed with attending who is in agreement with above plan Patient seen and examined at bedside. No acute complaints at this time. Pt endorses improvement in sx, notes he was able to stand and walk 1-2 steps to chair w/o assistance yesterday. Denies chest pain, shortness of breath, nausea or vomiting.     PE:  Vital Signs Last 24 Hrs  T(C): 37 (06-01-24 @ 04:42), Max: 37.1 (05-31-24 @ 16:23)  T(F): 98.6 (06-01-24 @ 04:42), Max: 98.8 (05-31-24 @ 20:18)  HR: 96 (06-01-24 @ 04:42) (87 - 108)  BP: 167/80 (06-01-24 @ 06:47) (128/79 - 185/84)  BP(mean): --  RR: 19 (06-01-24 @ 04:42) (16 - 19)  SpO2: 96% (06-01-24 @ 04:42) (95% - 99%)    General: NAD, resting comforatbly in bed  No midline or paraspinal TTP C/T/Lsp  No bony stepoffs  Neg colin  Neg Babinski  Neg Clonus  No Saddle Anesthesia  Passive/Active Rectal tone intact    Motor:                   C5                C6              C7               C8           T1   R             5/5                5/5            5/5              5/5          5/5  L             5/5                5/5            5/5              5/5          5/5                    L2                  L3             L4              L5            S1  R            5/5                4+/5             2/5*            0/5          5/5  L             5/5                5/5            4/5*            5/5          5/5    *BL Foot Drop - R DF To about 10-15 degrees below neutral, 2/5. L DF to about neutral, 5/5 at neutral    Sensory:            C5         C6         C7      C8       T1        (0=absent, 1=impaired, 2=normal, NT=not testable)  R         2            2           2        2         2  L          2            2           2        2         2               L2          L3         L4      L5       S1         (0=absent, 1=impaired, 2=normal, NT=not testable)  R         2            2            2        2        2  L          2            2           2        2         2        PT/INR - ( 31 May 2024 12:50 )   PT: 10.9 sec;   INR: 0.93 ratio         PTT - ( 31 May 2024 12:50 )  PTT:31.8 sec    A/P: 78M who presents with BLLE weakness    Pt seen w/ Dr. Richards at bedside. Pt with significant improvement in strength. At this time, no plan for OR this weekend, likely plan for OR thursday (6/6) for C1-C2 ACDF. CT H/Csp Ordered. CTa H/Neck Ordered for preoperative planning. Dr. Richards plans to see patient tomorrow, if no improvement compared to today or if pt worsens, may plan for OR monday. Plan pending evaluation tomorrow.   MRI Reviewed and discussed with patient at bedside.   Decadron 10 mg q8, Gabapentin 300mg BID, Flexeril 10mg TID  Analgesia  WBAT, PT/OT  DVT PPx: Hold chemical DVT PPx until final plan. SCDs.   Medical management appreciated. PLEASE DOCUMENT MEDICAL CLEARANCE FOR POSSIBLE OR TOMORROW.   Cardiac management appreciated. PLEASE DOCUMENT CARDIAC CLEARANCE FOR POSSIBLE OR TOMORROW.   Discussed with attending who is in agreement with above plan

## 2024-06-02 LAB
ALBUMIN SERPL ELPH-MCNC: 3.5 G/DL — SIGNIFICANT CHANGE UP (ref 3.3–5)
ALP SERPL-CCNC: 58 U/L — SIGNIFICANT CHANGE UP (ref 40–120)
ALT FLD-CCNC: 30 U/L — SIGNIFICANT CHANGE UP (ref 12–78)
ANION GAP SERPL CALC-SCNC: 6 MMOL/L — SIGNIFICANT CHANGE UP (ref 5–17)
APTT BLD: 27.2 SEC — SIGNIFICANT CHANGE UP (ref 24.5–35.6)
AST SERPL-CCNC: 21 U/L — SIGNIFICANT CHANGE UP (ref 15–37)
BILIRUB SERPL-MCNC: 0.4 MG/DL — SIGNIFICANT CHANGE UP (ref 0.2–1.2)
BUN SERPL-MCNC: 44 MG/DL — HIGH (ref 7–23)
CALCIUM SERPL-MCNC: 8.8 MG/DL — SIGNIFICANT CHANGE UP (ref 8.5–10.1)
CHLORIDE SERPL-SCNC: 108 MMOL/L — SIGNIFICANT CHANGE UP (ref 96–108)
CO2 SERPL-SCNC: 23 MMOL/L — SIGNIFICANT CHANGE UP (ref 22–31)
CREAT SERPL-MCNC: 1.3 MG/DL — SIGNIFICANT CHANGE UP (ref 0.5–1.3)
EGFR: 56 ML/MIN/1.73M2 — LOW
GLUCOSE SERPL-MCNC: 180 MG/DL — HIGH (ref 70–99)
HCT VFR BLD CALC: 39 % — SIGNIFICANT CHANGE UP (ref 39–50)
HGB BLD-MCNC: 12.3 G/DL — LOW (ref 13–17)
INR BLD: 0.87 RATIO — SIGNIFICANT CHANGE UP (ref 0.85–1.18)
MAGNESIUM SERPL-MCNC: 2.6 MG/DL — SIGNIFICANT CHANGE UP (ref 1.6–2.6)
MCHC RBC-ENTMCNC: 28.5 PG — SIGNIFICANT CHANGE UP (ref 27–34)
MCHC RBC-ENTMCNC: 31.5 GM/DL — LOW (ref 32–36)
MCV RBC AUTO: 90.5 FL — SIGNIFICANT CHANGE UP (ref 80–100)
NRBC # BLD: 0 /100 WBCS — SIGNIFICANT CHANGE UP (ref 0–0)
PLATELET # BLD AUTO: 312 K/UL — SIGNIFICANT CHANGE UP (ref 150–400)
POTASSIUM SERPL-MCNC: 4.6 MMOL/L — SIGNIFICANT CHANGE UP (ref 3.5–5.3)
POTASSIUM SERPL-SCNC: 4.6 MMOL/L — SIGNIFICANT CHANGE UP (ref 3.5–5.3)
PROT SERPL-MCNC: 6.7 G/DL — SIGNIFICANT CHANGE UP (ref 6–8.3)
PROTHROM AB SERPL-ACNC: 10.2 SEC — SIGNIFICANT CHANGE UP (ref 9.5–13)
RBC # BLD: 4.31 M/UL — SIGNIFICANT CHANGE UP (ref 4.2–5.8)
RBC # FLD: 13.5 % — SIGNIFICANT CHANGE UP (ref 10.3–14.5)
SODIUM SERPL-SCNC: 137 MMOL/L — SIGNIFICANT CHANGE UP (ref 135–145)
WBC # BLD: 12.07 K/UL — HIGH (ref 3.8–10.5)
WBC # FLD AUTO: 12.07 K/UL — HIGH (ref 3.8–10.5)

## 2024-06-02 PROCEDURE — 99233 SBSQ HOSP IP/OBS HIGH 50: CPT

## 2024-06-02 RX ADMIN — Medication 102 MILLIGRAM(S): at 14:36

## 2024-06-02 RX ADMIN — Medication 102 MILLIGRAM(S): at 05:35

## 2024-06-02 RX ADMIN — PANTOPRAZOLE SODIUM 40 MILLIGRAM(S): 20 TABLET, DELAYED RELEASE ORAL at 05:36

## 2024-06-02 RX ADMIN — GABAPENTIN 300 MILLIGRAM(S): 400 CAPSULE ORAL at 17:45

## 2024-06-02 RX ADMIN — GABAPENTIN 300 MILLIGRAM(S): 400 CAPSULE ORAL at 05:39

## 2024-06-02 RX ADMIN — Medication 102 MILLIGRAM(S): at 21:38

## 2024-06-02 RX ADMIN — DULOXETINE HYDROCHLORIDE 60 MILLIGRAM(S): 30 CAPSULE, DELAYED RELEASE ORAL at 11:52

## 2024-06-02 RX ADMIN — Medication 1 SPRAY(S): at 11:52

## 2024-06-02 NOTE — OCCUPATIONAL THERAPY INITIAL EVALUATION ADULT - BALANCE TRAINING, PT EVAL
Pt will perform grooming task standing at sink using RW with supervision for 2 minutes to improve functional activity standing tolerance in 3-5 OT sessions.

## 2024-06-02 NOTE — PROGRESS NOTE ADULT - SUBJECTIVE AND OBJECTIVE BOX
Patient seen and examined at bedside. No acute complaints at this time. Pt endorses improvement in sx, notes he was able to stand and walk 25 ft w/RW yesterday. Denies chest pain, shortness of breath, nausea or vomiting.     PE:    Vital Signs (24 Hrs):  T(C): 36.6 (06-02-24 @ 04:34), Max: 37.2 (06-01-24 @ 12:12)  HR: 71 (06-02-24 @ 04:34) (71 - 107)  BP: 127/63 (06-02-24 @ 04:34) (127/63 - 144/68)  RR: 19 (06-02-24 @ 04:34) (17 - 19)  SpO2: 93% (06-02-24 @ 04:34) (92% - 95%)  Wt(kg): --    LABS:                          12.3   12.07 )-----------( 312      ( 02 Jun 2024 06:45 )             39.0     06-02    137  |  108  |  44<H>  ----------------------------<  180<H>  4.6   |  23  |  1.30    Ca    8.8      02 Jun 2024 06:45  Mg     2.6     06-02    TPro  6.7  /  Alb  3.5  /  TBili  0.4  /  DBili  x   /  AST  21  /  ALT  30  /  AlkPhos  58  06-02    LIVER FUNCTIONS - ( 02 Jun 2024 06:45 )  Alb: 3.5 g/dL / Pro: 6.7 g/dL / ALK PHOS: 58 U/L / ALT: 30 U/L / AST: 21 U/L / GGT: x           PT/INR - ( 31 May 2024 12:50 )   PT: 10.9 sec;   INR: 0.93 ratio         PTT - ( 31 May 2024 12:50 )  PTT:31.8 sec    General: NAD, resting comforatbly in bed  No midline or paraspinal TTP C/T/Lsp  No bony stepoffs  Neg colin  Neg Babinski  Neg Clonus  No Saddle Anesthesia  Passive/Active Rectal tone intact    Motor:                   C5                C6              C7               C8           T1   R             5/5                5/5            5/5              5/5          5/5  L             5/5                5/5            5/5              5/5          5/5                    L2                  L3             L4              L5            S1  R            5/5                4+/5             2/5*            0/5          5/5  L             5/5                5/5            4/5*            5/5          5/5    *BL Foot Drop - R DF To about 10-15 degrees below neutral, 2/5. L DF to about neutral, 5/5 at neutral    Sensory:            C5         C6         C7      C8       T1        (0=absent, 1=impaired, 2=normal, NT=not testable)  R         2            2           2        2         2  L          2            2           2        2         2               L2          L3         L4      L5       S1         (0=absent, 1=impaired, 2=normal, NT=not testable)  R         2            2            2        2        2  L          2            2           2        2         2        A/P: 78M who presents with BLLE weakness    Pt with significant improvement in strength. At this time, no plan for OR this weekend, likely plan for OR thursday (6/6) for C1-C2 ACDF. CT H/Csp Ordered. CTa H/Neck Ordered for preoperative planning. Dr. Richards plans to see patient tomorrow, if no improvement compared to today or if pt worsens, may plan for OR monday. Plan pending evaluation tomorrow.   MRI Reviewed and discussed with patient at bedside.   Decadron 10 mg q8, Gabapentin 300mg BID, Flexeril 10mg TID  Analgesia  WBAT, PT/OT  DVT PPx: Hold chemical DVT PPx until final plan. SCDs.   Medical management appreciated. PLEASE DOCUMENT MEDICAL CLEARANCE FOR POSSIBLE OR Later this week.   Cardiac management appreciated. PLEASE DOCUMENT CARDIAC CLEARANCE FOR POSSIBLE OR Later this week   Discussed with attending who is in agreement with above plan

## 2024-06-02 NOTE — PROGRESS NOTE ADULT - SUBJECTIVE AND OBJECTIVE BOX
Patient is a 78y old  Male who presents with a chief complaint of lower extremity weakness, inability to walk (02 Jun 2024 08:16)    INTERVAL HPI/OVERNIGHT EVENTS: Patient was seen and examined. sitting in chair. Pain is better but felt a little unsteady walking to the bathroom earlier. Denies any chest pain/dyspnea. tolerating diet. afebrile.     I&O's Summary    01 Jun 2024 07:01  -  02 Jun 2024 07:00  --------------------------------------------------------  IN: 0 mL / OUT: 350 mL / NET: -350 mL        LABS:                        12.3   12.07 )-----------( 312      ( 02 Jun 2024 06:45 )             39.0     06-02    137  |  108  |  44<H>  ----------------------------<  180<H>  4.6   |  23  |  1.30    Ca    8.8      02 Jun 2024 06:45  Mg     2.6     06-02    TPro  6.7  /  Alb  3.5  /  TBili  0.4  /  DBili  x   /  AST  21  /  ALT  30  /  AlkPhos  58  06-02    PT/INR - ( 02 Jun 2024 06:45 )   PT: 10.2 sec;   INR: 0.87 ratio         PTT - ( 02 Jun 2024 06:45 )  PTT:27.2 sec  Urinalysis Basic - ( 02 Jun 2024 06:45 )    Color: x / Appearance: x / SG: x / pH: x  Gluc: 180 mg/dL / Ketone: x  / Bili: x / Urobili: x   Blood: x / Protein: x / Nitrite: x   Leuk Esterase: x / RBC: x / WBC x   Sq Epi: x / Non Sq Epi: x / Bacteria: x      CAPILLARY BLOOD GLUCOSE            Urinalysis Basic - ( 02 Jun 2024 06:45 )    Color: x / Appearance: x / SG: x / pH: x  Gluc: 180 mg/dL / Ketone: x  / Bili: x / Urobili: x   Blood: x / Protein: x / Nitrite: x   Leuk Esterase: x / RBC: x / WBC x   Sq Epi: x / Non Sq Epi: x / Bacteria: x        MEDICATIONS  (STANDING):  dexAMETHasone  IVPB 10 milliGRAM(s) IV Intermittent every 8 hours  DULoxetine 60 milliGRAM(s) Oral daily  fluticasone propionate 50 MICROgram(s)/spray Nasal Spray 1 Spray(s) Both Nostrils <User Schedule>  gabapentin 300 milliGRAM(s) Oral two times a day  pantoprazole    Tablet 40 milliGRAM(s) Oral before breakfast  polyethylene glycol 3350 17 Gram(s) Oral daily  senna 2 Tablet(s) Oral at bedtime    MEDICATIONS  (PRN):  acetaminophen     Tablet .. 650 milliGRAM(s) Oral every 6 hours PRN Temp greater or equal to 38C (100.4F), Mild Pain (1 - 3)  aluminum hydroxide/magnesium hydroxide/simethicone Suspension 30 milliLiter(s) Oral every 4 hours PRN Dyspepsia  calcium carbonate    500 mG (Tums) Chewable 1 Tablet(s) Chew three times a day PRN Indigestion  cyclobenzaprine 10 milliGRAM(s) Oral three times a day PRN Muscle Spasm  oxyCODONE    IR 5 milliGRAM(s) Oral every 6 hours PRN Moderate Pain (4 - 6)      REVIEW OF SYSTEMS:  CONSTITUTIONAL: No fever or chills  HEENT:  No headache  RESPIRATORY: No cough or shortness of breath  CARDIOVASCULAR: No chest pain  GASTROINTESTINAL: No abdominal pain, nausea, vomiting, or diarrhea  GENITOURINARY: No dysuria  NEUROLOGICAL: + lower extremity weakness     RADIOLOGY & ADDITIONAL TESTS:    Imaging Personally Reviewed:  [x] YES  [ ] NO    Consultant(s) Notes Reviewed:  [x] YES  [ ] NO    PHYSICAL EXAM:  T(C): 36.6 (06-02-24 @ 04:34), Max: 37.2 (06-01-24 @ 12:12)  HR: 71 (06-02-24 @ 04:34) (71 - 107)  BP: 127/63 (06-02-24 @ 04:34) (127/63 - 144/68)  RR: 19 (06-02-24 @ 04:34) (17 - 19)  SpO2: 93% (06-02-24 @ 04:34) (92% - 95%)    GENERAL: awake, alert  HEENT:  anicteric, moist mucous membranes  CHEST/LUNG:  CTA b/l, no rales, wheezes, or rhonchi  HEART:  RRR, S1, S2  ABDOMEN:  BS+, soft, nontender, nondistended  EXTREMITIES: no edema  NERVOUS SYSTEM: answers questions and follows commands appropriately  PSYCH: normal affect    Care Discussed with Consultants/Other Providers [x] YES  [ ] NO

## 2024-06-02 NOTE — PROGRESS NOTE ADULT - ASSESSMENT
The patient is a 78 year old male with a history of anxiety, GERD, spinal stenosis who presents with worsening bilateral leg pain, heaviness.     Plan:  - ECG with no evidence of ischemia or infarction  - BP elevated - possibly in the setting of pain and steroids. Monitor. If remains elevated will start antihypertensives post-procedure.  - Ortho follow-up  - The patient is at low risk for cardiac events for an intermediate risk surgery. He is optimized to proceed for back surgery from a cardiac standpoint.

## 2024-06-02 NOTE — PROGRESS NOTE ADULT - SUBJECTIVE AND OBJECTIVE BOX
Chief Complaint: Leg pain    Interval Events: No events overnight.    Review of Systems:  General: No fevers, chills, weight gain  Skin: No rashes, color changes  Cardiovascular: No chest pain, orthopnea  Respiratory: No shortness of breath, cough  Gastrointestinal: No nausea, abdominal pain  Genitourinary: No incontinence, pain with urination  Musculoskeletal: +pain, swelling, decreased range of motion  Neurological: No headache, weakness  Psychiatric: No depression, anxiety  Endocrine: No weight gain, increased thirst  All other systems are comprehensively negative.    Physical Exam:  Vitals:        Vital Signs Last 24 Hrs  T(C): 36.6 (02 Jun 2024 04:34), Max: 37.2 (01 Jun 2024 12:12)  T(F): 97.8 (02 Jun 2024 04:34), Max: 98.9 (01 Jun 2024 12:12)  HR: 71 (02 Jun 2024 04:34) (71 - 107)  BP: 127/63 (02 Jun 2024 04:34) (127/63 - 144/68)  BP(mean): --  RR: 19 (02 Jun 2024 04:34) (17 - 19)  SpO2: 93% (02 Jun 2024 04:34) (92% - 95%)  Parameters below as of 02 Jun 2024 04:34  Patient On (Oxygen Delivery Method): room air  General: NAD  HEENT: MMM  Neck: No JVD, no carotid bruit  Lungs: CTAB  CV: RRR, nl S1/S2, no M/R/G  Abdomen: S/NT/ND, +BS  Extremities: No LE edema, no cyanosis  Neuro: AAOx3, non-focal  Skin: No rash    Labs:                        12.3   12.07 )-----------( 312      ( 02 Jun 2024 06:45 )             39.0     06-02    137  |  108  |  44<H>  ----------------------------<  180<H>  4.6   |  23  |  1.30    Ca    8.8      02 Jun 2024 06:45  Mg     2.6     06-02    TPro  6.7  /  Alb  3.5  /  TBili  0.4  /  DBili  x   /  AST  21  /  ALT  30  /  AlkPhos  58  06-02        PT/INR - ( 02 Jun 2024 06:45 )   PT: 10.2 sec;   INR: 0.87 ratio         PTT - ( 02 Jun 2024 06:45 )  PTT:27.2 sec

## 2024-06-02 NOTE — PROGRESS NOTE ADULT - ASSESSMENT
77yo M PMH spinal stenosis presents to ED with bilateral lower extremity weakness. Admitted for bilateral lower extremity weakness, need for MRI for further evaluation.     BL lower extremity weakness     - MRI reviewed.     - ortho eval appreciated. OR date TBD pending patient's progression.     - cont decadron/gabapentin/flexeril. wbc elevated secondary to steroids.     - PT eval noted.     Anxiety     - cont duloxetine     - not using klonopin at home     elevated BP    - BP improved. likely secondary to pain     DVT proph: SCDs    Medically optimized for planned orthopedic procedure if indicated  No absolute contraindications  cardio clearance appreciated.

## 2024-06-03 ENCOUNTER — TRANSCRIPTION ENCOUNTER (OUTPATIENT)
Age: 79
End: 2024-06-03

## 2024-06-03 LAB
ALBUMIN SERPL ELPH-MCNC: 3.6 G/DL — SIGNIFICANT CHANGE UP (ref 3.3–5)
ALP SERPL-CCNC: 57 U/L — SIGNIFICANT CHANGE UP (ref 40–120)
ALT FLD-CCNC: 120 U/L — HIGH (ref 12–78)
ANION GAP SERPL CALC-SCNC: 6 MMOL/L — SIGNIFICANT CHANGE UP (ref 5–17)
APTT BLD: 23.7 SEC — LOW (ref 24.5–35.6)
AST SERPL-CCNC: 76 U/L — HIGH (ref 15–37)
BILIRUB SERPL-MCNC: 0.4 MG/DL — SIGNIFICANT CHANGE UP (ref 0.2–1.2)
BUN SERPL-MCNC: 47 MG/DL — HIGH (ref 7–23)
CALCIUM SERPL-MCNC: 8.8 MG/DL — SIGNIFICANT CHANGE UP (ref 8.5–10.1)
CHLORIDE SERPL-SCNC: 102 MMOL/L — SIGNIFICANT CHANGE UP (ref 96–108)
CO2 SERPL-SCNC: 29 MMOL/L — SIGNIFICANT CHANGE UP (ref 22–31)
CREAT SERPL-MCNC: 1.3 MG/DL — SIGNIFICANT CHANGE UP (ref 0.5–1.3)
EGFR: 56 ML/MIN/1.73M2 — LOW
GLUCOSE SERPL-MCNC: 141 MG/DL — HIGH (ref 70–99)
HCT VFR BLD CALC: 41 % — SIGNIFICANT CHANGE UP (ref 39–50)
HGB BLD-MCNC: 12.8 G/DL — LOW (ref 13–17)
INR BLD: 0.86 RATIO — SIGNIFICANT CHANGE UP (ref 0.85–1.18)
MCHC RBC-ENTMCNC: 28.2 PG — SIGNIFICANT CHANGE UP (ref 27–34)
MCHC RBC-ENTMCNC: 31.2 GM/DL — LOW (ref 32–36)
MCV RBC AUTO: 90.3 FL — SIGNIFICANT CHANGE UP (ref 80–100)
NRBC # BLD: 0 /100 WBCS — SIGNIFICANT CHANGE UP (ref 0–0)
PLATELET # BLD AUTO: 328 K/UL — SIGNIFICANT CHANGE UP (ref 150–400)
POTASSIUM SERPL-MCNC: 4.8 MMOL/L — SIGNIFICANT CHANGE UP (ref 3.5–5.3)
POTASSIUM SERPL-SCNC: 4.8 MMOL/L — SIGNIFICANT CHANGE UP (ref 3.5–5.3)
PROT SERPL-MCNC: 7 G/DL — SIGNIFICANT CHANGE UP (ref 6–8.3)
PROTHROM AB SERPL-ACNC: 10.1 SEC — SIGNIFICANT CHANGE UP (ref 9.5–13)
RBC # BLD: 4.54 M/UL — SIGNIFICANT CHANGE UP (ref 4.2–5.8)
RBC # FLD: 13.5 % — SIGNIFICANT CHANGE UP (ref 10.3–14.5)
SODIUM SERPL-SCNC: 137 MMOL/L — SIGNIFICANT CHANGE UP (ref 135–145)
WBC # BLD: 10.81 K/UL — HIGH (ref 3.8–10.5)
WBC # FLD AUTO: 10.81 K/UL — HIGH (ref 3.8–10.5)

## 2024-06-03 PROCEDURE — 99233 SBSQ HOSP IP/OBS HIGH 50: CPT

## 2024-06-03 RX ORDER — SODIUM CHLORIDE 9 MG/ML
1000 INJECTION INTRAMUSCULAR; INTRAVENOUS; SUBCUTANEOUS
Refills: 0 | Status: DISCONTINUED | OUTPATIENT
Start: 2024-06-03 | End: 2024-06-04

## 2024-06-03 RX ADMIN — Medication 1 TABLET(S): at 21:29

## 2024-06-03 RX ADMIN — GABAPENTIN 300 MILLIGRAM(S): 400 CAPSULE ORAL at 17:38

## 2024-06-03 RX ADMIN — DULOXETINE HYDROCHLORIDE 60 MILLIGRAM(S): 30 CAPSULE, DELAYED RELEASE ORAL at 12:03

## 2024-06-03 RX ADMIN — Medication 1 SPRAY(S): at 09:13

## 2024-06-03 RX ADMIN — PANTOPRAZOLE SODIUM 40 MILLIGRAM(S): 20 TABLET, DELAYED RELEASE ORAL at 05:29

## 2024-06-03 RX ADMIN — Medication 102 MILLIGRAM(S): at 14:46

## 2024-06-03 RX ADMIN — Medication 102 MILLIGRAM(S): at 21:28

## 2024-06-03 RX ADMIN — Medication 102 MILLIGRAM(S): at 05:28

## 2024-06-03 RX ADMIN — GABAPENTIN 300 MILLIGRAM(S): 400 CAPSULE ORAL at 05:28

## 2024-06-03 NOTE — PROGRESS NOTE ADULT - SUBJECTIVE AND OBJECTIVE BOX
SUBJECTIVE:       Pt seen and examined at bedside. No acute events overnight. Patient doing well overall. Reports pain well-controlled with medication. Ambulated over weekend. No CP, SOB, N/V, F/C, new N/T.    Vital Signs (24 Hrs):  T(C): 36.4 (06-03-24 @ 05:07), Max: 36.9 (06-02-24 @ 20:59)  HR: 65 (06-03-24 @ 05:07) (65 - 70)  BP: 144/72 (06-03-24 @ 05:07) (127/68 - 152/70)  RR: 19 (06-03-24 @ 05:07) (17 - 19)  SpO2: 93% (06-03-24 @ 05:07) (93% - 97%)  Wt(kg): --    LABS:                          12.3   12.07 )-----------( 312      ( 02 Jun 2024 06:45 )             39.0     06-02    137  |  108  |  44<H>  ----------------------------<  180<H>  4.6   |  23  |  1.30    Ca    8.8      02 Jun 2024 06:45  Mg     2.6     06-02    TPro  6.7  /  Alb  3.5  /  TBili  0.4  /  DBili  x   /  AST  21  /  ALT  30  /  AlkPhos  58  06-02    LIVER FUNCTIONS - ( 02 Jun 2024 06:45 )  Alb: 3.5 g/dL / Pro: 6.7 g/dL / ALK PHOS: 58 U/L / ALT: 30 U/L / AST: 21 U/L / GGT: x           PT/INR - ( 02 Jun 2024 06:45 )   PT: 10.2 sec;   INR: 0.87 ratio         PTT - ( 02 Jun 2024 06:45 )  PTT:27.2 sec    General: NAD, resting comforatbly in bed  No midline or paraspinal TTP C/T/Lsp  No bony stepoffs  Neg colin  Neg Babinski  Neg Clonus  No Saddle Anesthesia  Passive/Active Rectal tone intact    Motor:                   C5                C6              C7               C8           T1   R             5/5                5/5            5/5              5/5          5/5  L             5/5                5/5            5/5              5/5          5/5                    L2                  L3             L4              L5            S1  R            5/5                4+/5             2/5*            0/5          5/5  L             5/5                5/5            4/5*            5/5          5/5    *BL Foot Drop - R DF To about 10-15 degrees below neutral, 2/5. L DF to about neutral, 5/5 at neutral    Sensory:            C5         C6         C7      C8       T1        (0=absent, 1=impaired, 2=normal, NT=not testable)  R         2            2           2        2         2  L          2            2           2        2         2               L2          L3         L4      L5       S1         (0=absent, 1=impaired, 2=normal, NT=not testable)  R         2            2            2        2        2  L          2            2           2        2         2        A/P: 78M who presents with BLLE weakness    Plan for OR tomorrow (6/4) for C1-C2 PSF.  NPO except meds after MN, IVF while NPO  Hold all DVT chemoppx, use SCD instead  MRI Reviewed and discussed with patient at bedside.   Decadron 10 mg q8, Gabapentin 300mg BID, Flexeril 10mg TID  Analgesia  WBAT, PT/OT  Medical management appreciated. PLEASE DOCUMENT MEDICAL CLEARANCE FOR POSSIBLE OR  Cardiac management appreciated. PLEASE DOCUMENT CARDIAC CLEARANCE FOR POSSIBLE OR  Discussed with attending who is in agreement with above plan

## 2024-06-03 NOTE — PROGRESS NOTE ADULT - SUBJECTIVE AND OBJECTIVE BOX
Chief Complaint: Leg pain    Interval Events: No events overnight.    Review of Systems:  General: No fevers, chills, weight gain  Skin: No rashes, color changes  Cardiovascular: No chest pain, orthopnea  Respiratory: No shortness of breath, cough  Gastrointestinal: No nausea, abdominal pain  Genitourinary: No incontinence, pain with urination  Musculoskeletal: +pain, swelling, decreased range of motion  Neurological: No headache, weakness  Psychiatric: No depression, anxiety  Endocrine: No weight gain, increased thirst  All other systems are comprehensively negative.    Physical Exam:  Vital Signs Last 24 Hrs  T(C): 36.9 (02 Jun 2024 20:59), Max: 36.9 (02 Jun 2024 20:59)  T(F): 98.5 (02 Jun 2024 20:59), Max: 98.5 (02 Jun 2024 20:59)  HR: 68 (02 Jun 2024 20:59) (68 - 70)  BP: 135/69 (02 Jun 2024 20:59) (127/68 - 152/70)  BP(mean): --  RR: 17 (02 Jun 2024 20:59) (17 - 17)  SpO2: 95% (02 Jun 2024 20:59) (95% - 97%)  Parameters below as of 02 Jun 2024 20:59  Patient On (Oxygen Delivery Method): room air  General: NAD  HEENT: MMM  Neck: No JVD, no carotid bruit  Lungs: CTAB  CV: RRR, nl S1/S2, no M/R/G  Abdomen: S/NT/ND, +BS  Extremities: No LE edema, no cyanosis  Neuro: AAOx3, non-focal  Skin: No rash    Labs:             06-02    137  |  108  |  44<H>  ----------------------------<  180<H>  4.6   |  23  |  1.30    Ca    8.8      02 Jun 2024 06:45  Mg     2.6     06-02    TPro  6.7  /  Alb  3.5  /  TBili  0.4  /  DBili  x   /  AST  21  /  ALT  30  /  AlkPhos  58  06-02                          12.3   12.07 )-----------( 312      ( 02 Jun 2024 06:45 )             39.0

## 2024-06-03 NOTE — CASE MANAGEMENT PROGRESS NOTE - NSCMPROGRESSNOTE_GEN_ALL_CORE
Discussed patient in interdisciplinary rounds.  Patient is anticipated for OR C1C2 ACDF tomorrow.  Will monitor for transition needs and remain available.

## 2024-06-03 NOTE — PROGRESS NOTE ADULT - ASSESSMENT
The patient is a 78 year old male with a history of anxiety, GERD, spinal stenosis who presents with worsening bilateral leg pain, heaviness.     Plan:  - ECG with no evidence of ischemia or infarction  - BP acceptable off of antihypertensives  - Ortho follow-up  - The patient is at low risk for cardiac events for an intermediate risk surgery. He is optimized to proceed for back surgery from a cardiac standpoint.

## 2024-06-03 NOTE — PROGRESS NOTE ADULT - ASSESSMENT
77yo M PMH spinal stenosis presents to ED with bilateral lower extremity weakness. Admitted for bilateral lower extremity weakness, need for MRI for further evaluation.     BL lower extremity weakness     - MRI reviewed.     - ortho eval appreciated. OR date tomorrow.     - cont decadron/gabapentin/flexeril. wbc elevated secondary to steroids. improving.     - PT/OT eval noted.     Anxiety     - cont duloxetine     - not using klonopin at home     DVT proph: SCDs    Medically optimized for planned orthopedic procedure if indicated  No absolute contraindications  cardio clearance appreciated.   NPO after midnight

## 2024-06-03 NOTE — PROGRESS NOTE ADULT - SUBJECTIVE AND OBJECTIVE BOX
Patient is a 78y old  Male who presents with a chief complaint of lower extremity weakness, inability to walk (03 Jun 2024 06:28)    INTERVAL HPI/OVERNIGHT EVENTS: Patient was seen and examined. sitting in chair. Reports still feeling the same as yesterday. Has difficulty standing from laying and to stand upright. Afebrile. For OR tomorrow.     I&O's Summary    02 Jun 2024 07:01  -  03 Jun 2024 07:00  --------------------------------------------------------  IN: 810 mL / OUT: 0 mL / NET: 810 mL        LABS:                        12.8   10.81 )-----------( 328      ( 03 Jun 2024 05:58 )             41.0     06-03    137  |  102  |  47<H>  ----------------------------<  141<H>  4.8   |  29  |  1.30    Ca    8.8      03 Jun 2024 05:58  Mg     2.6     06-02    TPro  7.0  /  Alb  3.6  /  TBili  0.4  /  DBili  x   /  AST  76<H>  /  ALT  120<H>  /  AlkPhos  57  06-03    PT/INR - ( 03 Jun 2024 05:58 )   PT: 10.1 sec;   INR: 0.86 ratio         PTT - ( 03 Jun 2024 05:58 )  PTT:23.7 sec  Urinalysis Basic - ( 03 Jun 2024 05:58 )    Color: x / Appearance: x / SG: x / pH: x  Gluc: 141 mg/dL / Ketone: x  / Bili: x / Urobili: x   Blood: x / Protein: x / Nitrite: x   Leuk Esterase: x / RBC: x / WBC x   Sq Epi: x / Non Sq Epi: x / Bacteria: x      CAPILLARY BLOOD GLUCOSE            Urinalysis Basic - ( 03 Jun 2024 05:58 )    Color: x / Appearance: x / SG: x / pH: x  Gluc: 141 mg/dL / Ketone: x  / Bili: x / Urobili: x   Blood: x / Protein: x / Nitrite: x   Leuk Esterase: x / RBC: x / WBC x   Sq Epi: x / Non Sq Epi: x / Bacteria: x        MEDICATIONS  (STANDING):  dexAMETHasone  IVPB 10 milliGRAM(s) IV Intermittent every 8 hours  DULoxetine 60 milliGRAM(s) Oral daily  fluticasone propionate 50 MICROgram(s)/spray Nasal Spray 1 Spray(s) Both Nostrils <User Schedule>  gabapentin 300 milliGRAM(s) Oral two times a day  pantoprazole    Tablet 40 milliGRAM(s) Oral before breakfast  polyethylene glycol 3350 17 Gram(s) Oral daily  senna 2 Tablet(s) Oral at bedtime    MEDICATIONS  (PRN):  acetaminophen     Tablet .. 650 milliGRAM(s) Oral every 6 hours PRN Temp greater or equal to 38C (100.4F), Mild Pain (1 - 3)  aluminum hydroxide/magnesium hydroxide/simethicone Suspension 30 milliLiter(s) Oral every 4 hours PRN Dyspepsia  calcium carbonate    500 mG (Tums) Chewable 1 Tablet(s) Chew three times a day PRN Indigestion  cyclobenzaprine 10 milliGRAM(s) Oral three times a day PRN Muscle Spasm  oxyCODONE    IR 5 milliGRAM(s) Oral every 6 hours PRN Moderate Pain (4 - 6)      REVIEW OF SYSTEMS:  CONSTITUTIONAL: No fever or chills  HEENT:  No headache  RESPIRATORY: No shortness of breath  CARDIOVASCULAR: No chest pain  GASTROINTESTINAL: No abdominal pain, nausea, vomiting  GENITOURINARY: No dysuria  NEUROLOGICAL: +weakness  MUSCULOSKELETAL: no myalgias  PSYCH: no recent changes in mood    RADIOLOGY & ADDITIONAL TESTS:    Imaging Personally Reviewed:  [x] YES  [ ] NO    Consultant(s) Notes Reviewed:  [x] YES  [ ] NO    PHYSICAL EXAM:  T(C): 36.4 (06-03-24 @ 05:07), Max: 36.9 (06-02-24 @ 20:59)  HR: 65 (06-03-24 @ 05:07) (65 - 70)  BP: 144/72 (06-03-24 @ 05:07) (127/68 - 144/72)  RR: 19 (06-03-24 @ 05:07) (17 - 19)  SpO2: 93% (06-03-24 @ 05:07) (93% - 97%)    GENERAL: well-developed, well-groomed  HEENT:  anicteric, moist mucous membranes  CHEST/LUNG:  CTA b/l, no rales, wheezes, or rhonchi  HEART:  RRR, S1, S2  ABDOMEN:  BS+, soft, nontender, nondistended  EXTREMITIES: no edema  NERVOUS SYSTEM: answers questions and follows commands appropriately  PSYCH: normal affect    Care Discussed with Consultants/Other Providers [x] YES  [ ] NO

## 2024-06-04 ENCOUNTER — TRANSCRIPTION ENCOUNTER (OUTPATIENT)
Age: 79
End: 2024-06-04

## 2024-06-04 LAB
ALBUMIN SERPL ELPH-MCNC: 3.5 G/DL — SIGNIFICANT CHANGE UP (ref 3.3–5)
ALP SERPL-CCNC: 56 U/L — SIGNIFICANT CHANGE UP (ref 40–120)
ALT FLD-CCNC: 169 U/L — HIGH (ref 12–78)
ANION GAP SERPL CALC-SCNC: 4 MMOL/L — LOW (ref 5–17)
APTT BLD: 24.2 SEC — LOW (ref 24.5–35.6)
AST SERPL-CCNC: 63 U/L — HIGH (ref 15–37)
BILIRUB SERPL-MCNC: 0.5 MG/DL — SIGNIFICANT CHANGE UP (ref 0.2–1.2)
BUN SERPL-MCNC: 38 MG/DL — HIGH (ref 7–23)
BUN SERPL-MCNC: 43 MG/DL — HIGH (ref 7–23)
BUN SERPL-MCNC: 43 MG/DL — HIGH (ref 7–23)
CALCIUM SERPL-MCNC: 8.1 MG/DL — LOW (ref 8.5–10.1)
CALCIUM SERPL-MCNC: 8.8 MG/DL — SIGNIFICANT CHANGE UP (ref 8.5–10.1)
CALCIUM SERPL-MCNC: 8.8 MG/DL — SIGNIFICANT CHANGE UP (ref 8.5–10.1)
CHLORIDE SERPL-SCNC: 101 MMOL/L — SIGNIFICANT CHANGE UP (ref 96–108)
CHLORIDE SERPL-SCNC: 102 MMOL/L — SIGNIFICANT CHANGE UP (ref 96–108)
CHLORIDE SERPL-SCNC: 102 MMOL/L — SIGNIFICANT CHANGE UP (ref 96–108)
CO2 SERPL-SCNC: 27 MMOL/L — SIGNIFICANT CHANGE UP (ref 22–31)
CO2 SERPL-SCNC: 27 MMOL/L — SIGNIFICANT CHANGE UP (ref 22–31)
CO2 SERPL-SCNC: 28 MMOL/L — SIGNIFICANT CHANGE UP (ref 22–31)
CREAT SERPL-MCNC: 1 MG/DL — SIGNIFICANT CHANGE UP (ref 0.5–1.3)
CREAT SERPL-MCNC: 1.1 MG/DL — SIGNIFICANT CHANGE UP (ref 0.5–1.3)
CREAT SERPL-MCNC: 1.2 MG/DL — SIGNIFICANT CHANGE UP (ref 0.5–1.3)
EGFR: 62 ML/MIN/1.73M2 — SIGNIFICANT CHANGE UP
EGFR: 68 ML/MIN/1.73M2 — SIGNIFICANT CHANGE UP
EGFR: 77 ML/MIN/1.73M2 — SIGNIFICANT CHANGE UP
GLUCOSE SERPL-MCNC: 197 MG/DL — HIGH (ref 70–99)
GLUCOSE SERPL-MCNC: 199 MG/DL — HIGH (ref 70–99)
GLUCOSE SERPL-MCNC: 204 MG/DL — HIGH (ref 70–99)
HCT VFR BLD CALC: 37.8 % — LOW (ref 39–50)
HCT VFR BLD CALC: 41.5 % — SIGNIFICANT CHANGE UP (ref 39–50)
HGB BLD-MCNC: 12.2 G/DL — LOW (ref 13–17)
HGB BLD-MCNC: 13.3 G/DL — SIGNIFICANT CHANGE UP (ref 13–17)
INR BLD: 0.95 RATIO — SIGNIFICANT CHANGE UP (ref 0.85–1.18)
MAGNESIUM SERPL-MCNC: 2.3 MG/DL — SIGNIFICANT CHANGE UP (ref 1.6–2.6)
MCHC RBC-ENTMCNC: 28.5 PG — SIGNIFICANT CHANGE UP (ref 27–34)
MCHC RBC-ENTMCNC: 28.6 PG — SIGNIFICANT CHANGE UP (ref 27–34)
MCHC RBC-ENTMCNC: 32 GM/DL — SIGNIFICANT CHANGE UP (ref 32–36)
MCHC RBC-ENTMCNC: 32.3 GM/DL — SIGNIFICANT CHANGE UP (ref 32–36)
MCV RBC AUTO: 88.5 FL — SIGNIFICANT CHANGE UP (ref 80–100)
MCV RBC AUTO: 88.9 FL — SIGNIFICANT CHANGE UP (ref 80–100)
NRBC # BLD: 0 /100 WBCS — SIGNIFICANT CHANGE UP (ref 0–0)
NRBC # BLD: 0 /100 WBCS — SIGNIFICANT CHANGE UP (ref 0–0)
PLATELET # BLD AUTO: 282 K/UL — SIGNIFICANT CHANGE UP (ref 150–400)
PLATELET # BLD AUTO: 285 K/UL — SIGNIFICANT CHANGE UP (ref 150–400)
POTASSIUM SERPL-MCNC: 4.2 MMOL/L — SIGNIFICANT CHANGE UP (ref 3.5–5.3)
POTASSIUM SERPL-MCNC: 4.4 MMOL/L — SIGNIFICANT CHANGE UP (ref 3.5–5.3)
POTASSIUM SERPL-MCNC: 4.5 MMOL/L — SIGNIFICANT CHANGE UP (ref 3.5–5.3)
POTASSIUM SERPL-SCNC: 4.2 MMOL/L — SIGNIFICANT CHANGE UP (ref 3.5–5.3)
POTASSIUM SERPL-SCNC: 4.4 MMOL/L — SIGNIFICANT CHANGE UP (ref 3.5–5.3)
POTASSIUM SERPL-SCNC: 4.5 MMOL/L — SIGNIFICANT CHANGE UP (ref 3.5–5.3)
PROT SERPL-MCNC: 6.5 G/DL — SIGNIFICANT CHANGE UP (ref 6–8.3)
PROTHROM AB SERPL-ACNC: 11.1 SEC — SIGNIFICANT CHANGE UP (ref 9.5–13)
RBC # BLD: 4.27 M/UL — SIGNIFICANT CHANGE UP (ref 4.2–5.8)
RBC # BLD: 4.67 M/UL — SIGNIFICANT CHANGE UP (ref 4.2–5.8)
RBC # FLD: 13 % — SIGNIFICANT CHANGE UP (ref 10.3–14.5)
RBC # FLD: 13.1 % — SIGNIFICANT CHANGE UP (ref 10.3–14.5)
SODIUM SERPL-SCNC: 133 MMOL/L — LOW (ref 135–145)
WBC # BLD: 16.75 K/UL — HIGH (ref 3.8–10.5)
WBC # BLD: 8.97 K/UL — SIGNIFICANT CHANGE UP (ref 3.8–10.5)
WBC # FLD AUTO: 16.75 K/UL — HIGH (ref 3.8–10.5)
WBC # FLD AUTO: 8.97 K/UL — SIGNIFICANT CHANGE UP (ref 3.8–10.5)

## 2024-06-04 PROCEDURE — 99232 SBSQ HOSP IP/OBS MODERATE 35: CPT

## 2024-06-04 DEVICE — IMPLANTABLE DEVICE: Type: IMPLANTABLE DEVICE | Status: FUNCTIONAL

## 2024-06-04 DEVICE — SURGIFLO MATRIX WITH THROMBIN KIT: Type: IMPLANTABLE DEVICE | Status: FUNCTIONAL

## 2024-06-04 DEVICE — SET SCREW VP 2 TULIP AND LAMINAR HOOK: Type: IMPLANTABLE DEVICE | Status: FUNCTIONAL

## 2024-06-04 DEVICE — SCREW MA 3.5X12MM: Type: IMPLANTABLE DEVICE | Status: FUNCTIONAL

## 2024-06-04 DEVICE — BONE FILLER BIOCOMPOSITE STIMULAN RAPID CURE 10CC: Type: IMPLANTABLE DEVICE | Status: FUNCTIONAL

## 2024-06-04 RX ORDER — ONDANSETRON 8 MG/1
4 TABLET, FILM COATED ORAL ONCE
Refills: 0 | Status: DISCONTINUED | OUTPATIENT
Start: 2024-06-04 | End: 2024-06-04

## 2024-06-04 RX ORDER — DULOXETINE HYDROCHLORIDE 30 MG/1
60 CAPSULE, DELAYED RELEASE ORAL DAILY
Refills: 0 | Status: DISCONTINUED | OUTPATIENT
Start: 2024-06-05 | End: 2024-06-08

## 2024-06-04 RX ORDER — OXYCODONE HYDROCHLORIDE 5 MG/1
5 TABLET ORAL EVERY 4 HOURS
Refills: 0 | Status: DISCONTINUED | OUTPATIENT
Start: 2024-06-04 | End: 2024-06-08

## 2024-06-04 RX ORDER — SODIUM CHLORIDE 9 MG/ML
1000 INJECTION, SOLUTION INTRAVENOUS
Refills: 0 | Status: DISCONTINUED | OUTPATIENT
Start: 2024-06-04 | End: 2024-06-04

## 2024-06-04 RX ORDER — SENNA PLUS 8.6 MG/1
2 TABLET ORAL AT BEDTIME
Refills: 0 | Status: DISCONTINUED | OUTPATIENT
Start: 2024-06-04 | End: 2024-06-08

## 2024-06-04 RX ORDER — CLONAZEPAM 1 MG
0.5 TABLET ORAL THREE TIMES A DAY
Refills: 0 | Status: DISCONTINUED | OUTPATIENT
Start: 2024-06-04 | End: 2024-06-05

## 2024-06-04 RX ORDER — CYCLOBENZAPRINE HYDROCHLORIDE 10 MG/1
10 TABLET, FILM COATED ORAL EVERY 8 HOURS
Refills: 0 | Status: DISCONTINUED | OUTPATIENT
Start: 2024-06-04 | End: 2024-06-08

## 2024-06-04 RX ORDER — TRAMADOL HYDROCHLORIDE 50 MG/1
50 TABLET ORAL EVERY 6 HOURS
Refills: 0 | Status: DISCONTINUED | OUTPATIENT
Start: 2024-06-04 | End: 2024-06-08

## 2024-06-04 RX ORDER — OXYCODONE HYDROCHLORIDE 5 MG/1
10 TABLET ORAL EVERY 4 HOURS
Refills: 0 | Status: DISCONTINUED | OUTPATIENT
Start: 2024-06-04 | End: 2024-06-08

## 2024-06-04 RX ORDER — HYDROMORPHONE HYDROCHLORIDE 2 MG/ML
0.25 INJECTION INTRAMUSCULAR; INTRAVENOUS; SUBCUTANEOUS
Refills: 0 | Status: DISCONTINUED | OUTPATIENT
Start: 2024-06-04 | End: 2024-06-04

## 2024-06-04 RX ORDER — DEXAMETHASONE 0.5 MG/5ML
10 ELIXIR ORAL EVERY 8 HOURS
Refills: 0 | Status: COMPLETED | OUTPATIENT
Start: 2024-06-04 | End: 2024-06-05

## 2024-06-04 RX ORDER — POLYETHYLENE GLYCOL 3350 17 G/17G
17 POWDER, FOR SOLUTION ORAL AT BEDTIME
Refills: 0 | Status: DISCONTINUED | OUTPATIENT
Start: 2024-06-04 | End: 2024-06-08

## 2024-06-04 RX ORDER — HYDROMORPHONE HYDROCHLORIDE 2 MG/ML
0.5 INJECTION INTRAMUSCULAR; INTRAVENOUS; SUBCUTANEOUS
Refills: 0 | Status: DISCONTINUED | OUTPATIENT
Start: 2024-06-04 | End: 2024-06-04

## 2024-06-04 RX ORDER — ACETAMINOPHEN 500 MG
1000 TABLET ORAL ONCE
Refills: 0 | Status: DISCONTINUED | OUTPATIENT
Start: 2024-06-04 | End: 2024-06-06

## 2024-06-04 RX ORDER — GABAPENTIN 400 MG/1
300 CAPSULE ORAL EVERY 12 HOURS
Refills: 0 | Status: DISCONTINUED | OUTPATIENT
Start: 2024-06-04 | End: 2024-06-08

## 2024-06-04 RX ORDER — VANCOMYCIN HCL 1 G
1000 VIAL (EA) INTRAVENOUS EVERY 12 HOURS
Refills: 0 | Status: DISCONTINUED | OUTPATIENT
Start: 2024-06-04 | End: 2024-06-04

## 2024-06-04 RX ORDER — VANCOMYCIN HCL 1 G
1000 VIAL (EA) INTRAVENOUS EVERY 12 HOURS
Refills: 0 | Status: DISCONTINUED | OUTPATIENT
Start: 2024-06-04 | End: 2024-06-08

## 2024-06-04 RX ORDER — MAGNESIUM HYDROXIDE 400 MG/1
30 TABLET, CHEWABLE ORAL DAILY
Refills: 0 | Status: DISCONTINUED | OUTPATIENT
Start: 2024-06-04 | End: 2024-06-08

## 2024-06-04 RX ORDER — VANCOMYCIN HCL 1 G
1000 VIAL (EA) INTRAVENOUS ONCE
Refills: 0 | Status: COMPLETED | OUTPATIENT
Start: 2024-06-04 | End: 2024-06-04

## 2024-06-04 RX ORDER — DEXAMETHASONE 0.5 MG/5ML
10 ELIXIR ORAL EVERY 8 HOURS
Refills: 0 | Status: DISCONTINUED | OUTPATIENT
Start: 2024-06-04 | End: 2024-06-04

## 2024-06-04 RX ORDER — ONDANSETRON 8 MG/1
4 TABLET, FILM COATED ORAL EVERY 6 HOURS
Refills: 0 | Status: DISCONTINUED | OUTPATIENT
Start: 2024-06-04 | End: 2024-06-08

## 2024-06-04 RX ORDER — HYDROMORPHONE HYDROCHLORIDE 2 MG/ML
0.5 INJECTION INTRAMUSCULAR; INTRAVENOUS; SUBCUTANEOUS ONCE
Refills: 0 | Status: DISCONTINUED | OUTPATIENT
Start: 2024-06-04 | End: 2024-06-06

## 2024-06-04 RX ORDER — ACETAMINOPHEN 500 MG
650 TABLET ORAL EVERY 6 HOURS
Refills: 0 | Status: COMPLETED | OUTPATIENT
Start: 2024-06-04 | End: 2024-06-07

## 2024-06-04 RX ADMIN — PANTOPRAZOLE SODIUM 40 MILLIGRAM(S): 20 TABLET, DELAYED RELEASE ORAL at 05:13

## 2024-06-04 RX ADMIN — Medication 250 MILLIGRAM(S): at 15:41

## 2024-06-04 RX ADMIN — GABAPENTIN 300 MILLIGRAM(S): 400 CAPSULE ORAL at 05:13

## 2024-06-04 RX ADMIN — OXYCODONE HYDROCHLORIDE 10 MILLIGRAM(S): 5 TABLET ORAL at 23:43

## 2024-06-04 RX ADMIN — DULOXETINE HYDROCHLORIDE 60 MILLIGRAM(S): 30 CAPSULE, DELAYED RELEASE ORAL at 11:20

## 2024-06-04 RX ADMIN — Medication 102 MILLIGRAM(S): at 05:13

## 2024-06-04 RX ADMIN — Medication 102 MILLIGRAM(S): at 13:47

## 2024-06-04 RX ADMIN — Medication 1 SPRAY(S): at 09:11

## 2024-06-04 RX ADMIN — OXYCODONE HYDROCHLORIDE 10 MILLIGRAM(S): 5 TABLET ORAL at 22:43

## 2024-06-04 NOTE — PROGRESS NOTE ADULT - ASSESSMENT
77yo M PMH spinal stenosis presents to ED with bilateral lower extremity weakness. Admitted for bilateral lower extremity weakness, need for MRI for further evaluation.     BL lower extremity weakness   - MRI reviewed with degenerative changes multilevel  - ortho eval appreciated. Plan for C1-C2 PCDF (medically optimized)  - cont decadron/gabapentin/flexeril. wbc elevated secondary to steroids. improving.   - PT/OT eval noted., plan for re-eval post-procedure    Anxiety   - cont duloxetine   - not using klonopin at home     DVT proph: SCDs

## 2024-06-04 NOTE — DISCHARGE NOTE PROVIDER - NSDCCPCAREPLAN_GEN_ALL_CORE_FT
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 You had MRI of your spine that showed severe narrowing in the cervical and lumbar spine. The orthopedic spine surgeon evaluated your case /imaging and was concerned the cervical spine cord compression had significant contribution to your weakness and operated on your cervical spine to relieve the pressure on the spinal cord. You may need surgey on your L-spine in the future.  Continue to use the cephalexin antibiotic prophylactically as long as you have the spine drain in place. If it is removed, discuss with your surgeon whether you can stop the prophylactic antibiotic. Take probiotics while you are on antibiotics.  The right drain has not yet been removed. Please monitor output daily. Follow up with plastic surgeon and orthopedic spine surgeon in 1 week for possible removal of drain as outlined below.  Use sequential compression devices for legs for VTE prophylaxis for now. Do NOT use any blood thinners for now until cleared by surgeon to do so.  Follow the instructions per the orthopedic spine surgeon below:  1. Walk plenty  2. No lifting over 5 lbs  3. Use cervical hard collar at ALL times for now. Discuss with orthopedic spine surgeon at follow up appointments when it can be removed.   4. Keep Aquacel bandage on surgical incision, clean and dry. Change to a new one only if it gets wet or dirty.   5. For the left drain site (drain removed already), per plastic surgeon: Change dry dressing daily using (1) telfa, (2) 4x4 gauze, and (3) tegaderm. To do so, raise left border of Aquacel bandage (but DO NOT REMOVE BANDAGE) to access site, change telfa and 4x4, replace Aquacel border, and cover the 4x4 and Aquacel border with Tegaderm. Nurse at [facility] should be able to help you with this.  5. Pain meds: as above, if needed.  6. No driving on pain meds.  7. Follow up with orthopedic spine surgeon, Dr. Richards in ~7 days. Call to schedule.  8. Follow up with Dr. River (plastic surgeon) in about 7 days. Call to schedule.      SECONDARY DISCHARGE DIAGNOSES  Diagnosis: MSSA (methicillin-susceptible Staphylococcus aureus) colonization  Assessment and Plan of Treatment: You were found to have colonization of nostrils with MSSA. Please continue to use the mupirocin ointment twice a day to the nostrils for 4 more days.    Diagnosis: Anxiety  Assessment and Plan of Treatment: Continue your home regimen. Of note, sedating effects of medications like your Klonopin and the opiate pain relief medications can stack and cause life threatening sedation. Do not drink alcohol while taking these medications. Have a rescue naloxone inhaler and educate your wife and people around you to use the spray if you are difficult to wake up, lethargic, or breathing slowly, <10 breaths per minute.

## 2024-06-04 NOTE — DISCHARGE NOTE PROVIDER - CARE PROVIDER_API CALL
aBcilio Richards  Orthopaedic Surgery  [address]  [place] [address]  Phone: [phone]  Fax: [phone]  Follow Up Time:    Bacilio Richards  Orthopaedic Surgery  [address]  [place] [address]  Phone: [phone]  Fax: [phone]  Follow Up Time: 1 week    Bertin River  Plastic Surgery  [address]  [place] [address]  Phone: [phone]  Fax: [phone]  Follow Up Time: 1 week

## 2024-06-04 NOTE — DISCHARGE NOTE PROVIDER - NSDCFUADDINST_GEN_ALL_CORE_FT
1. Walk plenty  2. No lifting over 5 lbs  3. Use collar for comfort (neck surgery).  4. Keep bandage on, clean and dry. Change to a new one if gets wet or dirty.   5. Pain meds: eRx sent to your pharmacy electronically, you need to pick it up  6. No driving on pain meds  7. Follow up with Dr. Richards in about 7 days. Call to schedule.   1. Walk plenty  2. No lifting over 5 lbs  3. Use collar for comfort (neck surgery).  4. Keep Aquacel bandage on surgical incision, clean and dry. Change to a new one if gets wet or dirty. For left drain site (drain removed already) per plastic surgeon: change dry dressing daily using (1) telfa, (2) 4x4 gauze, and (3) tegaderm. To do so, raise left border of Aquacel bandage (but DO NOT REMOVE BANDAGE) to access site, change telfa and 4x4, replace Aquacel border, and cover the 4x4 and Aquacel border with Tegaderm. Nurse at [facility] should be able to help you with this.  5. Pain meds: eRx sent to your pharmacy electronically, you need to pick it up  6. No driving on pain meds  7. Follow up with Dr. Richards in about 7 days. Call to schedule.  8. Follow up with Dr. River (plastic surgeon) in about 7 days. Call to schedule.   1. Walk plenty  2. No lifting over 5 lbs  3. Use cervical hard collar at ALL times for now. Discuss with orthopedic spine surgeon at follow up appointments when it can be removed.   4. Keep Aquacel bandage on surgical incision, clean and dry. Change to a new one only if it gets wet or dirty.   5. For the left drain site (drain removed already), per plastic surgeon: Change dry dressing daily using (1) telfa, (2) 4x4 gauze, and (3) tegaderm. To do so, raise left border of Aquacel bandage (but DO NOT REMOVE BANDAGE) to access site, change telfa and 4x4, replace Aquacel border, and cover the 4x4 and Aquacel border with Tegaderm. Nurse at [facility] should be able to help you with this.  5. Pain meds: as above, if needed.  6. No driving on pain meds.  7. Follow up with orthopedic spine surgeon, Dr. Richards in about 7 days. Call to schedule.  8. Follow up with Dr. River (plastic surgeon) in about 7 days. Call to schedule.

## 2024-06-04 NOTE — PROGRESS NOTE ADULT - SUBJECTIVE AND OBJECTIVE BOX
Chief Complaint: Leg pain    Interval Events: No events overnight.    Review of Systems:  General: No fevers, chills, weight gain  Skin: No rashes, color changes  Cardiovascular: No chest pain, orthopnea  Respiratory: No shortness of breath, cough  Gastrointestinal: No nausea, abdominal pain  Genitourinary: No incontinence, pain with urination  Musculoskeletal: +pain, swelling, decreased range of motion  Neurological: No headache, weakness  Psychiatric: No depression, anxiety  Endocrine: No weight gain, increased thirst  All other systems are comprehensively negative.    Physical Exam:  Vital Signs Last 24 Hrs  T(C): 36.8 (04 Jun 2024 04:32), Max: 36.8 (04 Jun 2024 04:32)  T(F): 98.2 (04 Jun 2024 04:32), Max: 98.2 (04 Jun 2024 04:32)  HR: 64 (04 Jun 2024 04:32) (64 - 82)  BP: 165/77 (04 Jun 2024 04:32) (141/71 - 165/77)  BP(mean): --  RR: 19 (04 Jun 2024 04:32) (17 - 19)  SpO2: 94% (04 Jun 2024 04:32) (93% - 95%)  Parameters below as of 04 Jun 2024 04:32  Patient On (Oxygen Delivery Method): room air  General: NAD  HEENT: MMM  Neck: No JVD, no carotid bruit  Lungs: CTAB  CV: RRR, nl S1/S2, no M/R/G  Abdomen: S/NT/ND, +BS  Extremities: No LE edema, no cyanosis  Neuro: AAOx3, non-focal  Skin: No rash    Labs:             06-04    133<L>  |  101  |  43<H>  ----------------------------<  199<H>  4.4   |  28  |  1.20    Ca    8.8      04 Jun 2024 04:46  Mg     2.3     06-04    TPro  6.5  /  Alb  3.5  /  TBili  0.5  /  DBili  x   /  AST  63<H>  /  ALT  169<H>  /  AlkPhos  56  06-04                        13.3   8.97  )-----------( 285      ( 04 Jun 2024 04:46 )             41.5

## 2024-06-04 NOTE — DISCHARGE NOTE PROVIDER - CARE PROVIDERS DIRECT ADDRESSES
[email or web address]

## 2024-06-04 NOTE — CONSULT NOTE ADULT - ASSESSMENT
78 year old male with a PMH of spinal stenosis, L clavicle fx in 2023, anxiety who presented to the ED after being sent by Dr. Richards for worsening b/l LE pain and weakness.  Is now s/p C1-C2 PCDF.  Admitted to ICU for BP monitoring with a goal MAP >75.    -Monitor in ICU post operatively.  Goal MAP >75.  -Multimodal analgesia with tylenol, flexeril, oxycodone  -Decadron 10 mg q8, gabapentin q12 as per ortho  -Clear liquid diet, advance as tolerated  -SCDs DVT ppx   -Abx prophylaxis with vanco while drains in place  -Bowel regimen with senna while receiving narcotics     Case discussed w/ E-ICU attending Dr. Jean.    Time spent on this patient encounter, which includes documenting this note in the electronic medical record, was 45 minutes including assessing the presenting problems with associated risks, reviewing the medical record to prepare for the encounter, and meeting face to face with patient to obtain additional history. I have also performed an appropriate physical exam, made interventions listed and ordered and interpreted appropriate diagnostic studies as documented. To improve communication and patient safety, I have coordinated care with the multidisciplinary team including the bedside nurse, appropriate attending of record and consultants as needed.  Date of entry of this note is equal to the date of services rendered. 
The patient is a 78 year old male with a history of anxiety, GERD, spinal stenosis who presents with worsening bilateral leg pain, heaviness.     Plan:  - ECG with no evidence of ischemia or infarction  - BP elevated - possibly in the setting of pain and steroids. Monitor. If remains elevated will start antihypertensives post-procedure.  - Ortho eval  - The patient is at low risk for cardiac events for an intermediate risk surgery. He is optimized to proceed for back surgery from a cardiac standpoint.

## 2024-06-04 NOTE — PROGRESS NOTE ADULT - SUBJECTIVE AND OBJECTIVE BOX
Patient is a 78y old  Male who presents with a chief complaint of lower extremity weakness, inability to walk (04 Jun 2024 06:35)      SUBJECTIVE / OVERNIGHT EVENTS: Patient seen and examined at bedside. No acute events overnight. Feels okay with minimal back pain. Some leg pain, but minimal.    MEDICATIONS  (STANDING):  dexAMETHasone  IVPB 10 milliGRAM(s) IV Intermittent every 8 hours  DULoxetine 60 milliGRAM(s) Oral daily  fluticasone propionate 50 MICROgram(s)/spray Nasal Spray 1 Spray(s) Both Nostrils <User Schedule>  gabapentin 300 milliGRAM(s) Oral two times a day  pantoprazole    Tablet 40 milliGRAM(s) Oral before breakfast  polyethylene glycol 3350 17 Gram(s) Oral daily  senna 2 Tablet(s) Oral at bedtime  sodium chloride 0.9%. 1000 milliLiter(s) (100 mL/Hr) IV Continuous <Continuous>    MEDICATIONS  (PRN):  acetaminophen     Tablet .. 650 milliGRAM(s) Oral every 6 hours PRN Temp greater or equal to 38C (100.4F), Mild Pain (1 - 3)  aluminum hydroxide/magnesium hydroxide/simethicone Suspension 30 milliLiter(s) Oral every 4 hours PRN Dyspepsia  calcium carbonate    500 mG (Tums) Chewable 1 Tablet(s) Chew three times a day PRN Indigestion  cyclobenzaprine 10 milliGRAM(s) Oral three times a day PRN Muscle Spasm      CAPILLARY BLOOD GLUCOSE        I&O's Summary    03 Jun 2024 07:01  -  04 Jun 2024 07:00  --------------------------------------------------------  IN: 1010 mL / OUT: 0 mL / NET: 1010 mL        PHYSICAL EXAM:  Vital Signs Last 24 Hrs  T(C): 36.8 (04 Jun 2024 04:32), Max: 36.8 (04 Jun 2024 04:32)  T(F): 98.2 (04 Jun 2024 04:32), Max: 98.2 (04 Jun 2024 04:32)  HR: 64 (04 Jun 2024 04:32) (64 - 82)  BP: 165/77 (04 Jun 2024 04:32) (141/71 - 165/77)  BP(mean): --  RR: 19 (04 Jun 2024 04:32) (17 - 19)  SpO2: 94% (04 Jun 2024 04:32) (93% - 95%)    Parameters below as of 04 Jun 2024 04:32  Patient On (Oxygen Delivery Method): room air        GEN: male in NAD, appears comfortable, no diaphoresis  EYES: No scleral injection, EOMI  ENTM: neck supple & symmetric without tracheal deviation, moist membranes, no gross hearing impairment, thyroid gland not enlarged  CV: +S1/S2, no m/r/g, no abdominal bruit, no LE edema  RESP: breathing comfortably, no respiratory accessory muscle use, CTAB, no w/r/r  GI: normoactive BS, soft, NTND, no rebounding/guarding, no palpable masses    LABS:                        13.3   8.97  )-----------( 285      ( 04 Jun 2024 04:46 )             41.5     06-04    133<L>  |  101  |  43<H>  ----------------------------<  199<H>  4.4   |  28  |  1.20    Ca    8.8      04 Jun 2024 04:46  Mg     2.3     06-04    TPro  6.5  /  Alb  3.5  /  TBili  0.5  /  DBili  x   /  AST  63<H>  /  ALT  169<H>  /  AlkPhos  56  06-04    PT/INR - ( 04 Jun 2024 04:46 )   PT: 11.1 sec;   INR: 0.95 ratio         PTT - ( 04 Jun 2024 04:46 )  PTT:24.2 sec      Urinalysis Basic - ( 04 Jun 2024 04:46 )    Color: x / Appearance: x / SG: x / pH: x  Gluc: 199 mg/dL / Ketone: x  / Bili: x / Urobili: x   Blood: x / Protein: x / Nitrite: x   Leuk Esterase: x / RBC: x / WBC x   Sq Epi: x / Non Sq Epi: x / Bacteria: x          RADIOLOGY & ADDITIONAL TESTS:  Results Reviewed:   Imaging Personally Reviewed:  Electrocardiogram Personally Reviewed:    COORDINATION OF CARE:  Care Discussed with Consultants/Other Providers [Y/N]:  Prior or Outpatient Records Reviewed [Y/N]:

## 2024-06-04 NOTE — DISCHARGE NOTE PROVIDER - NSDCMRMEDTOKEN_GEN_ALL_CORE_FT
clonazePAM 0.5 mg oral tablet: 1 tab(s) orally 3 times a day  diclofenac sodium 100 mg oral tablet, extended release: 1 tab(s) orally once a day  DULoxetine 60 mg oral delayed release capsule: 1 cap(s) orally once a day  fluticasone 50 mcg/inh nasal spray: 1 spray(s) in each nostril once a day   clonazePAM 0.5 mg oral tablet: 1 tab(s) orally 3 times a day  diclofenac sodium 100 mg oral tablet, extended release: 1 tab(s) orally once a day  DULoxetine 60 mg oral delayed release capsule: 1 cap(s) orally once a day  fluticasone 50 mcg/inh nasal spray: 1 spray(s) in each nostril once a day  Jyothi Lora Colllar: wear at all times MDD: 1   cephalexin 500 mg oral tablet: 1 tab(s) orally every 6 hours until spinal drain has been removed and you have been cleared to stop antibiotics by the plastic surgeon or orthopedic spine surgeon.  clonazePAM 0.5 mg oral tablet: 1 tab(s) orally 3 times a day  cyclobenzaprine 10 mg oral tablet: 1 tab(s) orally every 8 hours as needed for  muscle spasm  DULoxetine 60 mg oral delayed release capsule: 1 cap(s) orally once a day  fluticasone 50 mcg/inh nasal spray: 1 spray(s) in each nostril once a day  gabapentin 300 mg oral capsule: 1 cap(s) orally every 12 hours  magnesium hydroxide 8% oral suspension: 30 milliliter(s) orally once a day As needed Constipation  Avery J Hard Colllar: wear at all times MDD: 1  Multiple Vitamins oral tablet: 1 tab(s) orally once a day  mupirocin 2% nasal ointment: 1 application nasal 2 times a day Apply to the outside of nostrils for 4 more days.  naloxone 3 mg/0.1 mL nasal spray: 1 spray(s) intranasally every 3 minutes as needed for  lethargy, unresponsiveness, or respiration rate &lt; 10 breaths per minute  oxyCODONE 10 mg oral tablet: 1 tab(s) orally every 6 hours as needed for Severe Pain (7 - 10)  oxyCODONE 5 mg oral tablet: 1 tab(s) orally every 6 hours as needed for Moderate Pain (4 - 6)  polyethylene glycol 3350 oral powder for reconstitution: 17 gram(s) orally once a day (at bedtime)  saccharomyces boulardii lyo 250 mg oral capsule: 1 cap(s) orally 2 times a day while taking antibiotics  senna leaf extract oral tablet: 2 tab(s) orally once a day (at bedtime)  traMADol 50 mg oral tablet: 1 tab(s) orally every 6 hours As needed Mild Pain (1 - 3)

## 2024-06-04 NOTE — PROVIDER CONTACT NOTE (EICU) - SITUATION
78 year old male with a PMH of spinal stenosis, L clavicle fx in 2023, anxiety who presented to the ED after being sent by Dr. Richards for worsening b/l LE pain and weakness.  Is now s/p C1-C2 PCDF.  Admitted to ICU for BP monitoring with a goal MAP >75.  Case discussed with the ICU PA.

## 2024-06-04 NOTE — DISCHARGE NOTE PROVIDER - HOSPITAL COURSE
HPI:  79yo M PMH spinal stenosis, L clavical fx in 12/2023, anxiety presenting to the ED sent in by Dr. Richards for worsening bilateral lower extremity pain. Pt has multi-level stenosis and has been experiencing mild weakness in his legs for the past few years, which has gotten acutely worse within the last week. Pt endorses immediate leg heaviness and fatigue on ambulation, with frequent cramps. Has been going to PT that  has been managing symptoms up until recently. Denies any trauma to the area. Denies back pain, chest pain, fevers, chills, tingling or numbness in the lower extremities, confusion, or vision changes.   Patient denies any past medical history of DM2/CHF/TIA/CVA/CAD/MI.   Denies any family history of adverse reactions to anesthesia.       In the ED  Vitals: T 97.7F, HR 94, /79, RR 18, Sat 99% on RA  Labs: CBC within normal, BUN elevated 27, Cr 1.3 Glucose 102, eGFR low 56  Imaging:   - MRI CERVICAL SPINE: Multilevel degenerative changes. C2-C3 kenya spinal cord compression without abnormal intrinsic cord signal. Remaining levels demonstrate predominantly mild spinal canal stenosis. Multilevel neural foraminal narrowing: C2-C3 mild to moderate left neural foraminal narrowing, C3-C4 marked bilateral neural foraminal narrowing, C4-C5 moderate left neural foraminal narrowing, C5-C6 moderate to marked right and mild to moderate left neural foraminal narrowing, C6-C7 moderate to marked right and moderate left neural foraminal narrowing.  - MRI THORACIC SPINE: Multilevel degenerative disc disease resulting in mild multilevel spinal  canal stenosis. Moderate left neural foraminal narrowing at T9 and T11-T12.  - MRI LUMBAR SPINE: Multilevel degenerative changes. L2-L3 marked thecal sac compression, L3-L4 moderate thecal sac compression with marked right and moderate left lateral recess stenosis, L4-L5 marked thecal sac compression, L5-S1 mass effect upon the bilateral descending S1 nerve roots. L3-L4 moderate right neural foraminal narrowing, L4-L5 moderate right neural foraminal narrowing, L5-S1 moderate right and moderate to severe left neural foraminal narrowing.  CXR official read pending  EKG NSR, VR 83, QT/QTc 378/444  Received 1L NS bolus x1 (31 May 2024 13:39)      ---  HOSPITAL COURSE:     ICU Course: Patient admitted to ICU postop s/p C1-C2 PCDF for BP monitoring with a goal MAP >75. Maintained MAP>75. Levophed discontinued.   Diet advanced to soft and bite sized. FS elevated >200. Placed on LDISS. A1c was ____.     Patient's clinical condition improved throughout hospital course and patient is stable for discharge *** with close outpatient follow up.     ---  CONSULTANTS:     ---  Physical Exam on the day of discharge:    ---  TIME SPENT:  I, the attending physician, was physically present for the key portions of the evaluation and management (E/M) service provided. The total amount of time spent reviewing the hospital notes, laboratory values, imaging findings, assessing/counseling the patient, discussing with consultant physicians, social work, nursing staff was -- minutes    ---  Primary care provider was made aware of plan for discharge:      [  ] NO     [  ] YES HPI as documented in H&P:  79yo M PMH spinal stenosis, L clavical fx in 12/2023, anxiety presenting to the ED sent in by Dr. Richards for worsening bilateral lower extremity pain. Pt has multi-level stenosis and has been experiencing mild weakness in his legs for the past few years, which has gotten acutely worse within the last week. Pt endorses immediate leg heaviness and fatigue on ambulation, with frequent cramps. Has been going to PT that  has been managing symptoms up until recently. Denies any trauma to the area. Denies back pain, chest pain, fevers, chills, tingling or numbness in the lower extremities, confusion, or vision changes.   Patient denies any past medical history of DM2/CHF/TIA/CVA/CAD/MI.   Denies any family history of adverse reactions to anesthesia.       In the ED  Vitals: T 97.7F, HR 94, /79, RR 18, Sat 99% on RA  Labs: CBC within normal, BUN elevated 27, Cr 1.3 Glucose 102, eGFR low 56  Imaging:   - MRI CERVICAL SPINE: Multilevel degenerative changes. C2-C3 kenya spinal cord compression without abnormal intrinsic cord signal. Remaining levels demonstrate predominantly mild spinal canal stenosis. Multilevel neural foraminal narrowing: C2-C3 mild to moderate left neural foraminal narrowing, C3-C4 marked bilateral neural foraminal narrowing, C4-C5 moderate left neural foraminal narrowing, C5-C6 moderate to marked right and mild to moderate left neural foraminal narrowing, C6-C7 moderate to marked right and moderate left neural foraminal narrowing.  - MRI THORACIC SPINE: Multilevel degenerative disc disease resulting in mild multilevel spinal  canal stenosis. Moderate left neural foraminal narrowing at T9 and T11-T12.  - MRI LUMBAR SPINE: Multilevel degenerative changes. L2-L3 marked thecal sac compression, L3-L4 moderate thecal sac compression with marked right and moderate left lateral recess stenosis, L4-L5 marked thecal sac compression, L5-S1 mass effect upon the bilateral descending S1 nerve roots. L3-L4 moderate right neural foraminal narrowing, L4-L5 moderate right neural foraminal narrowing, L5-S1 moderate right and moderate to severe left neural foraminal narrowing.  CXR official read pending  EKG NSR, VR 83, QT/QTc 378/444  Received 1L NS bolus x1 (31 May 2024 13:39)      ---  HOSPITAL COURSE:   Pt a/w cervical spinal cord compression and orthopedic spine surgery(Dr. Richards) consulted on the case. Pt treated with decadron, gabapentin and flexieril. Pt had C1-C2 PCDF and bilateral cervical paraspinal muscle flap and complex closure by plastics on 6/4. Pt transferred to the ICU post-op for close monitoring. Maintained MAP>75. Levophed discontinued. Decadron discontinued post-operatively per ortho. Pt continued on vancomycin for prophylaxis while he has drains in place. Ortho spine also recommended to avoid pharmacologic VTE prophylaxis for now with the serosanguinous drainage from the operative site and to hold off until cleared by surgical team as an outpatient. Pt was stable and transferred to the medical floor.   Diet advanced to soft and bite sized. FSG elevated >200. Placed on LDISS. Hgb A1c was 5.8%. Suspect elevated glucose was due to systemic steroids and stress of surgery, which also likely caused leukocytosis and BUN elevation. Pt's WBC count, BUN and glucose improved. Pt had no symptoms, signs, or physical exam findings consistent with infection. Pt had nares colonization with MSSA so he was given mupirocin ointment. Pt's LE strength gradually improved but RLE still with significant weakness in distal muscles. Physical therapy rec HETAL. Ortho pulled one of the two drains and cleared him for discharge with the other drain still in place and on Keflex for prophylaxis in the meantime. Ortho spine rec follow up with Plastic surgeon, Dr. River, and orthopedic spine surgeon, Dr. Richards in a week. Pt felt improved and was discharged to [facility].     ---  CONSULTANTS:   cardio: Dr. Colby [facility]  ortho spine: Dr. Richards  plastic surgery: Dr. River    ---  On the day of discharge:  ROS: pt denies neck pain, leg pain, leg numbness, fever, chills, SOB, cough, dysuria, n/v/abd pain.  Vital Signs Last 24 Hrs  T(C): 36.7 (08 Jun 2024 11:24), Max: 36.7 (08 Jun 2024 11:24)  T(F): 98.1 (08 Jun 2024 11:24), Max: 98.1 (08 Jun 2024 11:24)  HR: 87 (08 Jun 2024 11:24) (71 - 87)  BP: 98/61 (08 Jun 2024 11:24) (98/61 - 101/63)  BP(mean): --  RR: 18 (08 Jun 2024 11:24) (18 - 18)  SpO2: 98% (08 Jun 2024 11:24) (95% - 98%)    Parameters below as of 08 Jun 2024 11:24  Patient On (Oxygen Delivery Method): room air    PHYSICAL EXAM:  GENERAL: NAD at rest  HEENT:  anicteric, moist mucous membranes, hard cervical collar in place and drains in place  CHEST/LUNG:  CTA b/l, no rales, wheezes, or rhonchi appreciated  HEART:  RRR, S1, S2  ABDOMEN:  BS+, soft, nontender, nondistended  EXTREMITIES: no edema, cyanosis, or calf tenderness  NERVOUS SYSTEM: answers questions and follows commands appropriately; sensation to light touch intact; RLE 5/5 in hip/knee flexion, 2/5 strength in ankle, LLE 4+/5 strength    ---  TIME SPENT: 51 minutes

## 2024-06-04 NOTE — PROGRESS NOTE ADULT - SUBJECTIVE AND OBJECTIVE BOX
SUBJECTIVE:       Pt seen and examined at bedside. No acute events overnight. Feeling fine and aware of OR plan today. Reports pain well-controlled with medication. No CP, SOB, N/V, F/C, new N/T.    Vital Signs (24 Hrs):  T(C): 36.8 (06-04-24 @ 04:32), Max: 36.8 (06-04-24 @ 04:32)  HR: 64 (06-04-24 @ 04:32) (64 - 82)  BP: 165/77 (06-04-24 @ 04:32) (141/71 - 165/77)  RR: 19 (06-04-24 @ 04:32) (17 - 19)  SpO2: 94% (06-04-24 @ 04:32) (93% - 95%)  Wt(kg): --    LABS:                          13.3   8.97  )-----------( 285      ( 04 Jun 2024 04:46 )             41.5     06-04    133<L>  |  101  |  43<H>  ----------------------------<  199<H>  4.4   |  28  |  1.20    Ca    8.8      04 Jun 2024 04:46  Mg     2.3     06-04    TPro  6.5  /  Alb  3.5  /  TBili  0.5  /  DBili  x   /  AST  63<H>  /  ALT  169<H>  /  AlkPhos  56  06-04    LIVER FUNCTIONS - ( 04 Jun 2024 04:46 )  Alb: 3.5 g/dL / Pro: 6.5 g/dL / ALK PHOS: 56 U/L / ALT: 169 U/L / AST: 63 U/L / GGT: x           PT/INR - ( 04 Jun 2024 04:46 )   PT: 11.1 sec;   INR: 0.95 ratio         PTT - ( 04 Jun 2024 04:46 )  PTT:24.2 sec    EXAM:  General: NAD, resting comforatbly in bed  No midline or paraspinal TTP C/T/Lsp  No bony stepoffs  Neg colin  Neg Babinski  Neg Clonus  No Saddle Anesthesia  Passive/Active Rectal tone intact    Motor:                   C5                C6              C7               C8           T1   R             5/5                5/5            5/5              5/5          5/5  L             5/5                5/5            5/5              5/5          5/5                    L2                  L3             L4              L5            S1  R            5/5                4+/5             2/5*            0/5          5/5  L             5/5                5/5            4/5*            5/5          5/5    *BL Foot Drop - R DF To about 10-15 degrees below neutral, 2/5. L DF to about neutral, 5/5 at neutral    Sensory:            C5         C6         C7      C8       T1        (0=absent, 1=impaired, 2=normal, NT=not testable)  R         2            2           2        2         2  L          2            2           2        2         2               L2          L3         L4      L5       S1         (0=absent, 1=impaired, 2=normal, NT=not testable)  R         2            2            2        2        2  L          2            2           2        2         2        A/P: 78M who presents with BLLE weakness    Plan for OR today (6/4) for C1-C2 PCDF.  NPO except meds, IVF while NPO  Hold all DVT chemoppx, use SCD instead  MRI Reviewed and discussed with patient at bedside.   Decadron 10 mg q8, Gabapentin 300mg BID, Flexeril 10mg TID  Analgesia  WBAT, PT/OT  Medical management and optimization/clearance appreciated.  Cardiac management and optimization/clearance appreciated.  Discussed with attending who is in agreement with above plan

## 2024-06-04 NOTE — DISCHARGE NOTE PROVIDER - DISCHARGE DIET
Regular Diet - No restrictions Soft and Bite-Sized Diet/Nutrition Supplements/Other Diet Instructions

## 2024-06-04 NOTE — CONSULT NOTE ADULT - SUBJECTIVE AND OBJECTIVE BOX
Patient is a 78y old  Male who presents with a chief complaint of lower extremity weakness, inability to walk (04 Jun 2024 13:39)    BRIEF HOSPITAL COURSE: 78 year old male with a PMH of spinal stenosis, L clavicle fx in 2023, anxiety who presented to the ED after being sent by Dr. Richards for worsening b/l LE pain and weakness.  Is now s/p C1-C2 PCDF.  Admitted to ICU for BP monitoring with a goal MAP >75.    ROS: Reports discomfort post operatively.  ROS otherwise negative.     PAST MEDICAL & SURGICAL HISTORY:  Spinal stenosis  Fracture of left clavicle  Anxiety  No significant past surgical history        Medications:  vancomycin  IVPB 1000 milliGRAM(s) IV Intermittent every 12 hours  acetaminophen     Tablet .. 650 milliGRAM(s) Oral every 6 hours  acetaminophen   IVPB .. 1000 milliGRAM(s) IV Intermittent once  clonazePAM  Tablet 0.5 milliGRAM(s) Oral three times a day  cyclobenzaprine 10 milliGRAM(s) Oral every 8 hours  DULoxetine 60 milliGRAM(s) Oral daily  gabapentin 300 milliGRAM(s) Oral every 12 hours  HYDROmorphone  Injectable 0.5 milliGRAM(s) IV Push once  ondansetron Injectable 4 milliGRAM(s) IV Push every 6 hours PRN  oxyCODONE    IR 5 milliGRAM(s) Oral every 4 hours PRN  oxyCODONE    IR 10 milliGRAM(s) Oral every 4 hours PRN  traMADol 50 milliGRAM(s) Oral every 6 hours PRN  magnesium hydroxide Suspension 30 milliLiter(s) Oral daily PRN  polyethylene glycol 3350 17 Gram(s) Oral at bedtime  senna 2 Tablet(s) Oral at bedtime  dexAMETHasone  IVPB 10 milliGRAM(s) IV Intermittent every 8 hours  dexAMETHasone  IVPB 10 milliGRAM(s) IV Intermittent every 8 hours  multivitamin 1 Tablet(s) Oral daily        ICU Vital Signs Last 24 Hrs  T(C): 36.3 (04 Jun 2024 21:55), Max: 36.9 (04 Jun 2024 03:17)  T(F): 97.3 (04 Jun 2024 21:55), Max: 98.4 (04 Jun 2024 03:17)  HR: 68 (04 Jun 2024 21:15) (64 - 75)  BP: 113/52 (04 Jun 2024 21:15) (106/51 - 168/74)  BP(mean): 86 (04 Jun 2024 21:15) (86 - 86)  ABP: 132/58 (04 Jun 2024 21:15) (131/55 - 160/70)  ABP(mean): 83 (04 Jun 2024 21:15) (83 - 83)  RR: 15 (04 Jun 2024 21:00) (11 - 19)  SpO2: 100% (04 Jun 2024 21:00) (94% - 100%)    O2 Parameters below as of 04 Jun 2024 20:25  Patient On (Oxygen Delivery Method): nasal cannula  O2 Flow (L/min): 2      I&O's Detail    03 Jun 2024 07:01  -  04 Jun 2024 07:00  --------------------------------------------------------  IN:    IV PiggyBack: 50 mL    Oral Fluid: 960 mL    sodium chloride 0.9%: 100 mL  Total IN: 1110 mL    OUT:  Total OUT: 0 mL    Total NET: 1110 mL      04 Jun 2024 07:01  -  04 Jun 2024 22:03  --------------------------------------------------------  IN:    IV PiggyBack: 50 mL    sodium chloride 0.9%: 700 mL  Total IN: 750 mL    OUT:    Bulb (mL): 30 mL    Indwelling Catheter - Urethral (mL): 120 mL  Total OUT: 150 mL    Total NET: 600 mL            LABS:                        13.3   8.97  )-----------( 285      ( 04 Jun 2024 04:46 )             41.5     06-04    133<L>  |  101  |  43<H>  ----------------------------<  199<H>  4.4   |  28  |  1.20    Ca    8.8      04 Jun 2024 04:46  Mg     2.3     06-04    TPro  6.5  /  Alb  3.5  /  TBili  0.5  /  DBili  x   /  AST  63<H>  /  ALT  169<H>  /  AlkPhos  56  06-04          CAPILLARY BLOOD GLUCOSE        PT/INR - ( 04 Jun 2024 04:46 )   PT: 11.1 sec;   INR: 0.95 ratio         PTT - ( 04 Jun 2024 04:46 )  PTT:24.2 sec  Urinalysis Basic - ( 04 Jun 2024 04:46 )    Color: x / Appearance: x / SG: x / pH: x  Gluc: 199 mg/dL / Ketone: x  / Bili: x / Urobili: x   Blood: x / Protein: x / Nitrite: x   Leuk Esterase: x / RBC: x / WBC x   Sq Epi: x / Non Sq Epi: x / Bacteria: x      CULTURES:      Physical Examination:    General: No acute distress.      PULM: Clear to auscultation bilaterally    NECK: C collar in place, 2 drains in place with sanguinous drainage     CVS: Regular rate and rhythm    ABD: Soft, nondistended, nontender    EXT: No edema, nontender    SKIN: Warm and well perfused, no rashes noted.    NEURO: Lethargic post op but arousable       < from: MR Lumbar Spine No Cont (05.31.24 @ 14:57) >  MRI CERVICAL SPINE:  Multilevel degenerative changes.    C2-C3 kenya spinal cord compression without abnormal intrinsic cord   signal.    Remaining levels demonstrate predominantly mild spinal canal stenosis.    Multilevel neural foraminal narrowing: C2-C3 mild to moderate left neural   foraminal narrowing, C3-C4 marked bilateral neural foraminal narrowing,   C4-C5 moderate left neural foraminal narrowing, C5-C6 moderate to marked   right and mild to moderate left neural foraminal narrowing, C6-C7   moderate to marked right and moderate left neural foraminal narrowing.    MRI THORACIC SPINE:  No abnormal intrinsic cord signal.    Multilevel degenerative disc disease resulting in mild multilevel spinal   canal stenosis.    Moderate left neural foraminal narrowing at T9 and T11-T12.    MRI LUMBAR SPINE:  Multilevel degenerative changes.    L2-L3 marked thecal sac compression, L3-L4 moderate thecal sac   compression with marked right and moderate left lateral recess stenosis,   L4-L5 marked thecal sac compression, L5-S1 mass effect upon the bilateral   descending S1 nerve roots.    L3-L4 moderate right neural foraminal narrowing, L4-L5 moderate right   neural foraminal narrowing, L5-S1 moderate right and moderate to severe   left neural foraminal narrowing.    --- End of Report ---    < end of copied text >

## 2024-06-04 NOTE — DISCHARGE NOTE PROVIDER - INSTRUCTIONS
Ensure Enlive 1 can twice a day  Can advance diet to regular solid food if patient feels comfortable chewing it.

## 2024-06-04 NOTE — DISCHARGE NOTE PROVIDER - PROVIDER TOKENS
PROVIDER:[TOKEN:[[id number]]] PROVIDER:[TOKEN:[[id number]],FOLLOWUP:[1 week]],PROVIDER:[TOKEN:[[id number]],FOLLOWUP:[1 week]]

## 2024-06-04 NOTE — DISCHARGE NOTE PROVIDER - NSDCCPTREATMENT_GEN_ALL_CORE_FT
PRINCIPAL PROCEDURE  Procedure: Posterior fusion of cervical spine with laminectomy  Findings and Treatment: C1-C2 PCDF

## 2024-06-05 LAB
A1C WITH ESTIMATED AVERAGE GLUCOSE RESULT: 5.8 % — HIGH (ref 4–5.6)
ANION GAP SERPL CALC-SCNC: 8 MMOL/L — SIGNIFICANT CHANGE UP (ref 5–17)
BUN SERPL-MCNC: 38 MG/DL — HIGH (ref 7–23)
CALCIUM SERPL-MCNC: 8 MG/DL — LOW (ref 8.5–10.1)
CHLORIDE SERPL-SCNC: 99 MMOL/L — SIGNIFICANT CHANGE UP (ref 96–108)
CO2 SERPL-SCNC: 27 MMOL/L — SIGNIFICANT CHANGE UP (ref 22–31)
CREAT SERPL-MCNC: 0.98 MG/DL — SIGNIFICANT CHANGE UP (ref 0.5–1.3)
EGFR: 78 ML/MIN/1.73M2 — SIGNIFICANT CHANGE UP
ESTIMATED AVERAGE GLUCOSE: 120 MG/DL — HIGH (ref 68–114)
GLUCOSE SERPL-MCNC: 208 MG/DL — HIGH (ref 70–99)
HCT VFR BLD CALC: 37.9 % — LOW (ref 39–50)
HGB BLD-MCNC: 12.2 G/DL — LOW (ref 13–17)
MCHC RBC-ENTMCNC: 28.3 PG — SIGNIFICANT CHANGE UP (ref 27–34)
MCHC RBC-ENTMCNC: 32.2 GM/DL — SIGNIFICANT CHANGE UP (ref 32–36)
MCV RBC AUTO: 87.9 FL — SIGNIFICANT CHANGE UP (ref 80–100)
MRSA PCR RESULT.: SIGNIFICANT CHANGE UP
NRBC # BLD: 0 /100 WBCS — SIGNIFICANT CHANGE UP (ref 0–0)
PLATELET # BLD AUTO: 283 K/UL — SIGNIFICANT CHANGE UP (ref 150–400)
POTASSIUM SERPL-MCNC: 4.5 MMOL/L — SIGNIFICANT CHANGE UP (ref 3.5–5.3)
POTASSIUM SERPL-SCNC: 4.5 MMOL/L — SIGNIFICANT CHANGE UP (ref 3.5–5.3)
RBC # BLD: 4.31 M/UL — SIGNIFICANT CHANGE UP (ref 4.2–5.8)
RBC # FLD: 13.2 % — SIGNIFICANT CHANGE UP (ref 10.3–14.5)
S AUREUS DNA NOSE QL NAA+PROBE: DETECTED
SODIUM SERPL-SCNC: 134 MMOL/L — LOW (ref 135–145)
VANCOMYCIN TROUGH SERPL-MCNC: 9.8 UG/ML — LOW (ref 10–20)
WBC # BLD: 14.11 K/UL — HIGH (ref 3.8–10.5)
WBC # FLD AUTO: 14.11 K/UL — HIGH (ref 3.8–10.5)

## 2024-06-05 PROCEDURE — 99233 SBSQ HOSP IP/OBS HIGH 50: CPT

## 2024-06-05 PROCEDURE — 99233 SBSQ HOSP IP/OBS HIGH 50: CPT | Mod: GC

## 2024-06-05 RX ORDER — GLUCAGON INJECTION, SOLUTION 0.5 MG/.1ML
1 INJECTION, SOLUTION SUBCUTANEOUS ONCE
Refills: 0 | Status: DISCONTINUED | OUTPATIENT
Start: 2024-06-05 | End: 2024-06-05

## 2024-06-05 RX ORDER — CLONAZEPAM 1 MG
0.5 TABLET ORAL THREE TIMES A DAY
Refills: 0 | Status: DISCONTINUED | OUTPATIENT
Start: 2024-06-05 | End: 2024-06-08

## 2024-06-05 RX ORDER — DEXTROSE 50 % IN WATER 50 %
15 SYRINGE (ML) INTRAVENOUS ONCE
Refills: 0 | Status: DISCONTINUED | OUTPATIENT
Start: 2024-06-05 | End: 2024-06-05

## 2024-06-05 RX ORDER — INSULIN LISPRO 100/ML
VIAL (ML) SUBCUTANEOUS
Refills: 0 | Status: DISCONTINUED | OUTPATIENT
Start: 2024-06-05 | End: 2024-06-05

## 2024-06-05 RX ORDER — SODIUM CHLORIDE 9 MG/ML
1000 INJECTION, SOLUTION INTRAVENOUS
Refills: 0 | Status: DISCONTINUED | OUTPATIENT
Start: 2024-06-05 | End: 2024-06-05

## 2024-06-05 RX ORDER — INSULIN LISPRO 100/ML
VIAL (ML) SUBCUTANEOUS AT BEDTIME
Refills: 0 | Status: DISCONTINUED | OUTPATIENT
Start: 2024-06-05 | End: 2024-06-05

## 2024-06-05 RX ORDER — DEXTROSE 50 % IN WATER 50 %
12.5 SYRINGE (ML) INTRAVENOUS ONCE
Refills: 0 | Status: DISCONTINUED | OUTPATIENT
Start: 2024-06-05 | End: 2024-06-05

## 2024-06-05 RX ORDER — NOREPINEPHRINE BITARTRATE/D5W 8 MG/250ML
0.01 PLASTIC BAG, INJECTION (ML) INTRAVENOUS
Qty: 8 | Refills: 0 | Status: DISCONTINUED | OUTPATIENT
Start: 2024-06-05 | End: 2024-06-05

## 2024-06-05 RX ORDER — DEXTROSE 10 % IN WATER 10 %
125 INTRAVENOUS SOLUTION INTRAVENOUS ONCE
Refills: 0 | Status: DISCONTINUED | OUTPATIENT
Start: 2024-06-05 | End: 2024-06-05

## 2024-06-05 RX ORDER — DEXTROSE 50 % IN WATER 50 %
25 SYRINGE (ML) INTRAVENOUS ONCE
Refills: 0 | Status: DISCONTINUED | OUTPATIENT
Start: 2024-06-05 | End: 2024-06-05

## 2024-06-05 RX ADMIN — Medication 1 TABLET(S): at 11:04

## 2024-06-05 RX ADMIN — Medication 102 MILLIGRAM(S): at 06:47

## 2024-06-05 RX ADMIN — DULOXETINE HYDROCHLORIDE 60 MILLIGRAM(S): 30 CAPSULE, DELAYED RELEASE ORAL at 11:05

## 2024-06-05 RX ADMIN — Medication 650 MILLIGRAM(S): at 11:10

## 2024-06-05 RX ADMIN — Medication 650 MILLIGRAM(S): at 06:21

## 2024-06-05 RX ADMIN — Medication 0.5 MILLIGRAM(S): at 13:13

## 2024-06-05 RX ADMIN — CYCLOBENZAPRINE HYDROCHLORIDE 10 MILLIGRAM(S): 10 TABLET, FILM COATED ORAL at 21:56

## 2024-06-05 RX ADMIN — CYCLOBENZAPRINE HYDROCHLORIDE 10 MILLIGRAM(S): 10 TABLET, FILM COATED ORAL at 05:21

## 2024-06-05 RX ADMIN — Medication 102 MILLIGRAM(S): at 13:13

## 2024-06-05 RX ADMIN — Medication 650 MILLIGRAM(S): at 11:04

## 2024-06-05 RX ADMIN — Medication 1.4 MICROGRAM(S)/KG/MIN: at 13:25

## 2024-06-05 RX ADMIN — GABAPENTIN 300 MILLIGRAM(S): 400 CAPSULE ORAL at 05:21

## 2024-06-05 RX ADMIN — Medication 650 MILLIGRAM(S): at 05:21

## 2024-06-05 RX ADMIN — POLYETHYLENE GLYCOL 3350 17 GRAM(S): 17 POWDER, FOR SOLUTION ORAL at 21:55

## 2024-06-05 RX ADMIN — Medication 650 MILLIGRAM(S): at 17:44

## 2024-06-05 RX ADMIN — GABAPENTIN 300 MILLIGRAM(S): 400 CAPSULE ORAL at 17:42

## 2024-06-05 RX ADMIN — Medication 250 MILLIGRAM(S): at 18:37

## 2024-06-05 RX ADMIN — Medication 250 MILLIGRAM(S): at 05:21

## 2024-06-05 RX ADMIN — Medication 1.4 MICROGRAM(S)/KG/MIN: at 00:59

## 2024-06-05 RX ADMIN — SENNA PLUS 2 TABLET(S): 8.6 TABLET ORAL at 21:55

## 2024-06-05 RX ADMIN — Medication 0.5 MILLIGRAM(S): at 05:21

## 2024-06-05 RX ADMIN — Medication 102 MILLIGRAM(S): at 22:00

## 2024-06-05 RX ADMIN — Medication 650 MILLIGRAM(S): at 00:19

## 2024-06-05 RX ADMIN — Medication 650 MILLIGRAM(S): at 00:09

## 2024-06-05 RX ADMIN — Medication 650 MILLIGRAM(S): at 17:43

## 2024-06-05 RX ADMIN — CYCLOBENZAPRINE HYDROCHLORIDE 10 MILLIGRAM(S): 10 TABLET, FILM COATED ORAL at 13:13

## 2024-06-05 NOTE — OCCUPATIONAL THERAPY INITIAL EVALUATION ADULT - PERSONAL SAFETY AND JUDGMENT, REHAB EVAL
cueing for hospital safety/impaired
pt attempting to ambulate without RW, pt declining assist for ADLs/impaired

## 2024-06-05 NOTE — OCCUPATIONAL THERAPY INITIAL EVALUATION ADULT - ADDITIONAL COMMENTS
Pt lives with his wife in a private house with 2 steps to enter with handrail and 10 steps to bedroom. Pt has a shower/tub combo with shower curtain. Pt was independent with ADLs but has used a RW the last few weeks due to weakness in bilateral LE's. Pt owns a shower chair and a commode due to wife requiring these items due to an injury in December, but pt does not use. Pt is left hand dominant. Pt performed sit to stand and ambulated 5' bed to chair with hand held assist/min assist x 1. Pt requires assistance with ADL's and transfers due to spinal px, decreased flexibility, decreased strength, decreased endurance and impaired sitting/standing balance.
Pt lives with his wife in a private house with 2 MAKAYLA and bed/bath upstairs. Pt independent with ADLs but has used a RW the last few weeks due to weakness in BLEs. Pt owns a stall shower with doors. Pt owns a shower chair and a commode due to wife requiring this items due to an injury in December, but pt does not use. Pt independent in IADLs and does his own food shopping.

## 2024-06-05 NOTE — PHYSICAL THERAPY INITIAL EVALUATION ADULT - ACTIVE RANGE OF MOTION EXAMINATION, REHAB EVAL
within c-spine precautions/bilateral upper extremity Active ROM was WFL (within functional limits)/bilateral  lower extremity Active ROM was WFL (within functional limits)
bilateral upper extremity Active ROM was WFL (within functional limits)/bilateral  lower extremity Active ROM was WFL (within functional limits)

## 2024-06-05 NOTE — CHART NOTE - NSCHARTNOTEFT_GEN_A_CORE
Pt seen at bedside to be fitted with correctly sized [place] J collar to be used at ll times after surgery.  Pt instructed to don and doff\  Written instructions and office contact info provided  Remove collar by loosening one strap only to insure proper reapplication      Follow up if needed    Edson López [facility]  [phone]
case will now be done Tuesday, 6/4
Called by RN, pt with heart burn, would like TUMS. Pt also states he was recently started on a PPI outpatient and would like to take it here as well. Medication not in med rec however surescripts shows it was recently prescribed.    Will order protonix oral daily. RN to please call with any changes

## 2024-06-05 NOTE — OCCUPATIONAL THERAPY INITIAL EVALUATION ADULT - ADL RETRAINING, OT EVAL
Pt will perform LE dressing using AE as needed with supervision in 3-5 OT sessions.
Patient will dress upper body with minimal assistance in 3-5 sessions. Patient will dress lower body with minimal assistance, AE as needed in 2-4 sessions.

## 2024-06-05 NOTE — OCCUPATIONAL THERAPY INITIAL EVALUATION ADULT - LEVEL OF INDEPENDENCE, OT EVAL
sponge bathing while seated/maximum assist (25% patients effort)
as per clinical reasoning/minimum assist (75% patients effort)/moderate assist (50% patients effort)

## 2024-06-05 NOTE — PHYSICAL THERAPY INITIAL EVALUATION ADULT - GENERAL OBSERVATIONS, REHAB EVAL
Received supine in bed with + hard c-collar donned, tele, A-line, (B) SCDs, quiroz catheter, SERGEI x 2.
Received supine in bed with + heplock IV, RA. Patient in no apparent distress.

## 2024-06-05 NOTE — PHYSICAL THERAPY INITIAL EVALUATION ADULT - PERTINENT HX OF CURRENT PROBLEM, REHAB EVAL
77yo M [facility] spinal stenosis presents to ED with bilateral lower extremity weakness. Admitted for bilateral lower extremity weakness, need for MRI for further evaluation. Pending possible OR 6/2.
78M POD1 C1-C2 PCDF

## 2024-06-05 NOTE — PHYSICAL THERAPY INITIAL EVALUATION ADULT - TRANSFER TRAINING, PT EVAL
GOAL: Patient will transfer between sitting/standing independently with use of rolling walker (2 weeks)
GOAL: Patient will transfer between sitting/standing independently with use of rolling walker (2 weeks)

## 2024-06-05 NOTE — OCCUPATIONAL THERAPY INITIAL EVALUATION ADULT - GENERAL OBSERVATIONS, REHAB EVAL
Pt received OOB in bedside chair, in NAD, on RA, agreeable to OT eval.
Patient found supine in bed in ICU with +A line left UE, SERGEI drains (2), +SCD and +hard cervical collar. Patient chart reviewed, events noted. Patient cleared to be seen for therapy by RN prior to session.

## 2024-06-05 NOTE — PROGRESS NOTE ADULT - SUBJECTIVE AND OBJECTIVE BOX
Pt seen at bedside this AM. No acute complaints, pain is well managed. Denies new numbness or tingling, fevers, chills, CP, SOB, N/V/D, urinary/bowel incontinence.    Vital Signs (24 Hrs):  T(C): 36.4 (06-05-24 @ 04:36), Max: 36.6 (06-04-24 @ 20:45)  HR: 60 (06-05-24 @ 06:30) (45 - 75)  BP: 126/60 (06-05-24 @ 06:15) (101/51 - 162/77)  RR: 10 (06-05-24 @ 06:30) (9 - 20)  SpO2: 99% (06-05-24 @ 06:30) (96% - 100%)  Wt(kg): --    LABS:                          12.2   14.11 )-----------( 283      ( 05 Jun 2024 05:40 )             37.9     06-04    133<L>  |  102  |  38<H>  ----------------------------<  204<H>  4.2   |  27  |  1.00    Ca    8.1<L>      04 Jun 2024 22:25  Mg     2.3     06-04    TPro  6.5  /  Alb  3.5  /  TBili  0.5  /  DBili  x   /  AST  63<H>  /  ALT  169<H>  /  AlkPhos  56  06-04    LIVER FUNCTIONS - ( 04 Jun 2024 04:46 )  Alb: 3.5 g/dL / Pro: 6.5 g/dL / ALK PHOS: 56 U/L / ALT: 169 U/L / AST: 63 U/L / GGT: x           PT/INR - ( 04 Jun 2024 04:46 )   PT: 11.1 sec;   INR: 0.95 ratio         PTT - ( 04 Jun 2024 04:46 )  PTT:24.2 sec    Physical Exam:   General: NAD, patient laying in bed comfortably  Calves nontender  Compartments compressible, soft    Spine:  Dressings C/D/I, hard collar in place  2 SERGEI drains in place    MOTOR EXAM:                          Elbow Flex (C5)     Wrist Ext (C6)     Elbow Ext (C7)      Finger Flex (C8)    Finger Abduction (T1)  RIGHT                 5/5                         5/5                         5/5                          5/5                              5/5  LEFT                    5/5                         5/5                         5/5                          5/5                              5/5                         Hip Flex (L2)      Knee Ext (L3)      Ank Dorsiflex (L4)     Hallux Ext (L5)     Ank PlantarFlex (S1)  RIGHT               5/5                      4+/5                         2/5                            1/5                           5/5  LEFT                  5/5                      5/5                          4/5                            5/5                           5/5  *BL Foot Drop - R DF To about 10-15 degrees below neutral, 2/5. L DF to about neutral, 4/5 at neutral    SENSORY EXAM:                        C5      C6      C7      C8       T1          RIGHT          2         2        2         2         2          (0=absent, 1=impaired, 2=normal, NT=not testable)  LEFT             2         2        2         2         2          (0=absent, 1=impaired, 2=normal, NT=not testable)                      L2        L3       L4      L5       S1          RIGHT        2          2         2        2        2           (0=absent, 1=impaired, 2=normal, NT=not testable)  LEFT           2          2         2        2        2           (0=absent, 1=impaired, 2=normal, NT=not testable)    No pain with SLR, no clonus, no babinksi, no colin    A/P: 78M POD1 C1-C2 PCDF     WBAT, hard C-collar on at all times  PT/OT  Analgesics  Hold DVT chemical ppx, SCDs ok  Please record drain outputs  Remove quiroz POD1 when ambulating  Incentive spirometry  Dispo: pending PT eval and improvement in clinical course  Appreciate medical recs  Appreciate ICU management  Will discuss plan with Dr. Richards and notify primary team of any changes to plan     Pt seen at bedside this AM. No acute complaints, pain is well managed. Denies new numbness or tingling, fevers, chills, CP, SOB, N/V/D, urinary/bowel incontinence.    Vital Signs (24 Hrs):  T(C): 36.4 (06-05-24 @ 04:36), Max: 36.6 (06-04-24 @ 20:45)  HR: 60 (06-05-24 @ 06:30) (45 - 75)  BP: 126/60 (06-05-24 @ 06:15) (101/51 - 162/77)  RR: 10 (06-05-24 @ 06:30) (9 - 20)  SpO2: 99% (06-05-24 @ 06:30) (96% - 100%)  Wt(kg): --    LABS:                          12.2   14.11 )-----------( 283      ( 05 Jun 2024 05:40 )             37.9     06-04    133<L>  |  102  |  38<H>  ----------------------------<  204<H>  4.2   |  27  |  1.00    Ca    8.1<L>      04 Jun 2024 22:25  Mg     2.3     06-04    TPro  6.5  /  Alb  3.5  /  TBili  0.5  /  DBili  x   /  AST  63<H>  /  ALT  169<H>  /  AlkPhos  56  06-04    LIVER FUNCTIONS - ( 04 Jun 2024 04:46 )  Alb: 3.5 g/dL / Pro: 6.5 g/dL / ALK PHOS: 56 U/L / ALT: 169 U/L / AST: 63 U/L / GGT: x           PT/INR - ( 04 Jun 2024 04:46 )   PT: 11.1 sec;   INR: 0.95 ratio         PTT - ( 04 Jun 2024 04:46 )  PTT:24.2 sec    Physical Exam:   General: NAD, patient laying in bed comfortably  Calves nontender  Compartments compressible, soft    Spine:  Dressings C/D/I, hard collar in place  2 SERGEI drains in place    MOTOR EXAM:                          Elbow Flex (C5)     Wrist Ext (C6)     Elbow Ext (C7)      Finger Flex (C8)    Finger Abduction (T1)  RIGHT                 5/5                         5/5                         5/5                          5/5                              5/5  LEFT                    5/5                         5/5                         5/5                          5/5                              5/5                         Hip Flex (L2)      Knee Ext (L3)      Ank Dorsiflex (L4)     Hallux Ext (L5)     Ank PlantarFlex (S1)  RIGHT               5/5                      4+/5                         2/5                            1/5                           5/5  LEFT                  5/5                      5/5                          4/5                            5/5                           5/5  *BL Foot Drop - R DF To about 10-15 degrees below neutral, 2/5. L DF to about neutral, 4/5 at neutral    SENSORY EXAM:                        C5      C6      C7      C8       T1          RIGHT          2         2        2         2         2          (0=absent, 1=impaired, 2=normal, NT=not testable)  LEFT             2         2        2         2         2          (0=absent, 1=impaired, 2=normal, NT=not testable)                      L2        L3       L4      L5       S1          RIGHT        2          2         2        2        2           (0=absent, 1=impaired, 2=normal, NT=not testable)  LEFT           2          2         2        2        2           (0=absent, 1=impaired, 2=normal, NT=not testable)    No pain with SLR, no clonus, no babinksi, no colin    A/P: 78M POD1 C1-C2 PCDF     WBAT, hard C-collar on at all times  PT/OT  Analgesics  Hold DVT chemical ppx, SCDs ok  Please record drain outputs  Remove quiroz POD1 when ambulating  Incentive spirometry  Dispo: pending PT eval and improvement in clinical course  ICU transfer for 24hr, MAP goals >80  Appreciate medical recs  Appreciate ICU management  Will discuss plan with Dr. Richards and notify primary team of any changes to plan     Pt seen at bedside this AM. No acute complaints, pain is well managed. Denies new numbness or tingling, fevers, chills, CP, SOB, N/V/D, urinary/bowel incontinence.    Vital Signs (24 Hrs):  T(C): 36.4 (06-05-24 @ 04:36), Max: 36.6 (06-04-24 @ 20:45)  HR: 60 (06-05-24 @ 06:30) (45 - 75)  BP: 126/60 (06-05-24 @ 06:15) (101/51 - 162/77)  RR: 10 (06-05-24 @ 06:30) (9 - 20)  SpO2: 99% (06-05-24 @ 06:30) (96% - 100%)  Wt(kg): --    LABS:                          12.2   14.11 )-----------( 283      ( 05 Jun 2024 05:40 )             37.9     06-04    133<L>  |  102  |  38<H>  ----------------------------<  204<H>  4.2   |  27  |  1.00    Ca    8.1<L>      04 Jun 2024 22:25  Mg     2.3     06-04    TPro  6.5  /  Alb  3.5  /  TBili  0.5  /  DBili  x   /  AST  63<H>  /  ALT  169<H>  /  AlkPhos  56  06-04    LIVER FUNCTIONS - ( 04 Jun 2024 04:46 )  Alb: 3.5 g/dL / Pro: 6.5 g/dL / ALK PHOS: 56 U/L / ALT: 169 U/L / AST: 63 U/L / GGT: x           PT/INR - ( 04 Jun 2024 04:46 )   PT: 11.1 sec;   INR: 0.95 ratio         PTT - ( 04 Jun 2024 04:46 )  PTT:24.2 sec    Physical Exam:   General: NAD, patient laying in bed comfortably  Calves nontender  Compartments compressible, soft    Spine:  Dressings C/D/I, hard collar in place  2 SERGEI drains in place    MOTOR EXAM:                          Elbow Flex (C5)     Wrist Ext (C6)     Elbow Ext (C7)      Finger Flex (C8)    Finger Abduction (T1)  RIGHT                 5/5                         5/5                         5/5                          5/5                              5/5  LEFT                    5/5                         5/5                         5/5                          5/5                              5/5                         Hip Flex (L2)      Knee Ext (L3)      Ank Dorsiflex (L4)     Hallux Ext (L5)     Ank PlantarFlex (S1)  RIGHT               5/5                      4+/5                         2/5                            1/5                           5/5  LEFT                  5/5                      5/5                          4/5                            5/5                           5/5  *BL Foot Drop - R DF To about 10-15 degrees below neutral, 2/5. L DF to about neutral, 4/5 at neutral    SENSORY EXAM:                        C5      C6      C7      C8       T1          RIGHT          2         2        2         2         2          (0=absent, 1=impaired, 2=normal, NT=not testable)  LEFT             2         2        2         2         2          (0=absent, 1=impaired, 2=normal, NT=not testable)                      L2        L3       L4      L5       S1          RIGHT        2          2         2        2        2           (0=absent, 1=impaired, 2=normal, NT=not testable)  LEFT           2          2         2        2        2           (0=absent, 1=impaired, 2=normal, NT=not testable)    No pain with SLR, no clonus, no babinksi, no colin    A/P: 78M POD1 C1-C2 PCDF     WBAT, hard C-collar on at all times  PT/OT  Analgesics  Hold DVT chemical ppx, SCDs ok  Please record drain outputs  Remove quiroz POD1 when ambulating  Continue decadron 1 day course  Incentive spirometry  Dispo: pending PT eval and improvement in clinical course  ICU transfer for 24hr, MAP goals >80  Appreciate medical recs  Appreciate ICU management  Will discuss plan with Dr. Richards and notify primary team of any changes to plan

## 2024-06-05 NOTE — PROVIDER CONTACT NOTE (EICU) - BACKGROUND
78 year old male w/ PMHx of spinal stenosis, L clavicle fx in 2023, anxiety now s/p C1-C2 PCDF.  Admitted to ICU for BP monitoring with a goal MAP >75. On Vanco postop.
labs reviewed  < from: MR Cervical Spine No Cont (05.31.24 @ 14:54) >    C2-C3: Uncovering of the intervertebral disc and posterior ligamentous   hypertrophy. Left facet/ligamentous hypertrophy. Rojas spinal cord   compression without abnormal intrinsic cord signal.    < end of copied text >

## 2024-06-05 NOTE — PROGRESS NOTE ADULT - ASSESSMENT
77yo M PMH spinal stenosis presents to ED with bilateral lower extremity weakness. Admitted for bilateral lower extremity weakness, need for MRI for further evaluation.     BL lower extremity weakness   - MRI reviewed with degenerative changes multilevel  - ortho eval appreciated. s/p C1-C2 PCDF and Bilateral cervical paraspinal muscle flap and complex closure by plastics on 6/4  - cont decadron/gabapentin/flexeril. wbc elevated secondary to steroids. improving.   - PT/OT eval noted., plan for re-eval post-procedure  - C-Collar per ortho    Anxiety   - cont duloxetine   - not using klonopin at home     DVT proph: SCDs

## 2024-06-05 NOTE — DIETITIAN INITIAL EVALUATION ADULT - PROBLEM SELECTOR PLAN 1
Patient presenting with worsening lower extremity weakness bilaterally, sent to [facility] ED by orthopedics Dr. Richards  - MRI CERVICAL SPINE: Multilevel degenerative changes. C2-C3 [place] spinal cord compression without abnormal intrinsic cord signal. Remaining levels demonstrate predominantly mild spinal canal stenosis. Multilevel neural foraminal narrowing: C2-C3 mild to moderate left neural foraminal narrowing, C3-C4 marked bilateral neural foraminal narrowing, C4-C5 moderate left neural foraminal narrowing, C5-C6 moderate to marked right and mild to moderate left neural foraminal narrowing, C6-C7 moderate to marked right and moderate left neural foraminal narrowing.  - MRI THORACIC SPINE: Multilevel degenerative disc disease resulting in mild multilevel spinal  canal stenosis. Moderate left neural foraminal narrowing at T9 and T11-T12.  - MRI LUMBAR SPINE: Multilevel degenerative changes. L2-L3 marked thecal sac compression, L3-L4 moderate thecal sac compression with marked right and moderate left lateral recess stenosis, L4-L5 marked thecal sac compression, L5-S1 mass effect upon the bilateral descending S1 nerve roots. L3-L4 moderate right neural foraminal narrowing, L4-L5 moderate right neural foraminal narrowing, L5-S1 moderate right and moderate to severe left neural foraminal narrowing.  - Decadron 10 mg q8 x3 days  - Pain control: Tylenol PRN for mild pain, oxy 5mg q6 PRN for moderate pain, gabapentin, flexeril PRN  - WBAT  - PT/OT  - NPO after midnight  - Maintenance IVF with NS at 75 cc/hr   -CARDIAC CLEARANCE REQUESTED Dr Clark  - Ortho Dr. Richards following, f/u final recs

## 2024-06-05 NOTE — DIETITIAN INITIAL EVALUATION ADULT - ADD RECOMMEND
1) Continue soft/bite size diet as tolerated; honor food preferences as able to optimize po intake/tolerance, continue to monitor BS and need for consistent carbohydrate diet restriction  2) Continue MVI daily  3) Monitor po intake, diet tolerance, weight trends, labs, GI function, skin integrity

## 2024-06-05 NOTE — PROGRESS NOTE ADULT - SUBJECTIVE AND OBJECTIVE BOX
INTERVAL HPI/OVERNIGHT EVENTS: No acute overnight events occurred.     SUBJECTIVE: Seen and examined pt at bedside. Has no acute complaints at this time.    Review of Systems:  Constitutional: +fatigue No fever, chills  Neuro: No headache, numbness, weakness  Resp: No cough, wheezing, shortness of breath  CVS: No chest pain, palpitations, leg swelling  GI: No abdominal pain, nausea, vomiting, diarrhea   : No dysuria, frequency, incontinence  Skin: No itching, burning, rashes, or lesions   Msk: No joint pain or swelling  Psych: No depression, anxiety, mood swings    ICU Vital Signs Last 24 Hrs  T(C): 36.7 (05 Jun 2024 07:54), Max: 36.7 (05 Jun 2024 07:54)  T(F): 98 (05 Jun 2024 07:54), Max: 98 (05 Jun 2024 07:54)  HR: 70 (05 Jun 2024 11:00) (45 - 75)  BP: 119/58 (05 Jun 2024 11:00) (101/51 - 162/77)  BP(mean): 84 (05 Jun 2024 11:00) (74 - 97)  ABP: 102/61 (05 Jun 2024 11:00) (102/61 - 248/106)  ABP(mean): 78 (05 Jun 2024 11:00) (69 - 155)  RR: 23 (05 Jun 2024 11:00) (9 - 23)  SpO2: 100% (05 Jun 2024 11:00) (96% - 100%)    O2 Parameters below as of 05 Jun 2024 08:00  Patient On (Oxygen Delivery Method): room air              06-04-24 @ 07:01  -  06-05-24 @ 07:00  --------------------------------------------------------  IN: 1239 mL / OUT: 805 mL / NET: 434 mL    06-05-24 @ 07:01  -  06-05-24 @ 11:24  --------------------------------------------------------  IN: 0 mL / OUT: 100 mL / NET: -100 mL        CAPILLARY BLOOD GLUCOSE          I&O's Summary    04 Jun 2024 07:01  -  05 Jun 2024 07:00  --------------------------------------------------------  IN: 1239 mL / OUT: 805 mL / NET: 434 mL    05 Jun 2024 07:01  -  05 Jun 2024 11:24  --------------------------------------------------------  IN: 0 mL / OUT: 100 mL / NET: -100 mL        PHYSICAL EXAM:  Gen: NAD, drowsy appearing  Neuro: A&Ox3, answering questions and following commands appropriately  HEENT: EOMI, +C collar in place, b/l SERGEI drains with serosanguinous drainage  Resp: CTABL  Cardio: RRR  Abd: Soft, NT, +BS  Ext: no edema   Skin: warm and dry    Meds:  vancomycin  IVPB IV Intermittent    norepinephrine Infusion IV Continuous    dexAMETHasone  IVPB IV Intermittent  dextrose 50% Injectable IV Push  dextrose 50% Injectable IV Push  dextrose Oral Gel Oral PRN  glucagon  Injectable IntraMuscular  insulin lispro (ADMELOG) corrective regimen sliding scale SubCutaneous  insulin lispro (ADMELOG) corrective regimen sliding scale SubCutaneous      acetaminophen     Tablet .. Oral  acetaminophen   IVPB .. IV Intermittent  clonazePAM  Tablet Oral  cyclobenzaprine Oral  DULoxetine Oral  gabapentin Oral  HYDROmorphone  Injectable IV Push  ondansetron Injectable IV Push PRN  oxyCODONE    IR Oral PRN  oxyCODONE    IR Oral PRN  traMADol Oral PRN        magnesium hydroxide Suspension Oral PRN  polyethylene glycol 3350 Oral  senna Oral      dextrose 10% Bolus IV Bolus  dextrose 5%. IV Continuous  dextrose 5%. IV Continuous  multivitamin Oral                                  12.2   14.11 )-----------( 283      ( 05 Jun 2024 05:40 )             37.9       06-05    134<L>  |  99  |  38<H>  ----------------------------<  208<H>  4.5   |  27  |  0.98    Ca    8.0<L>      05 Jun 2024 05:40  Mg     2.3     06-04    TPro  6.5  /  Alb  3.5  /  TBili  0.5  /  DBili  x   /  AST  63<H>  /  ALT  169<H>  /  AlkPhos  56  06-04          PT/INR - ( 04 Jun 2024 04:46 )   PT: 11.1 sec;   INR: 0.95 ratio         PTT - ( 04 Jun 2024 04:46 )  PTT:24.2 sec  Urinalysis Basic - ( 05 Jun 2024 05:40 )    Color: x / Appearance: x / SG: x / pH: x  Gluc: 208 mg/dL / Ketone: x  / Bili: x / Urobili: x   Blood: x / Protein: x / Nitrite: x   Leuk Esterase: x / RBC: x / WBC x   Sq Epi: x / Non Sq Epi: x / Bacteria: x                  Radiology: none    Bedside Ultrasound: n/a    Tubes/Lines: L radial A line   R and L PIV  R and L SERGEI drain       GLOBAL ISSUE/BEST PRACTICE:  Analgesia: Y  Sedation: N  HOB elevation: Y  Stress ulcer prophylaxis: N  VTE prophylaxis: Y  Glycemic control: Y  Nutrition: Y    CODE STATUS: Full code

## 2024-06-05 NOTE — OCCUPATIONAL THERAPY INITIAL EVALUATION ADULT - TRANSFER TRAINING, PT EVAL
Pt will perform commode transfer using appropriate assistive device with supervision in 3-5 OT sessions.
Patient will transfer to toilet with DME as needed with supervision in 3-5 sessions.

## 2024-06-05 NOTE — PROGRESS NOTE ADULT - SUBJECTIVE AND OBJECTIVE BOX
Chief Complaint: Leg pain    Interval Events: No events overnight. Sleeping.    Review of Systems:  General: No fevers, chills, weight gain  Skin: No rashes, color changes  Cardiovascular: No chest pain, orthopnea  Respiratory: No shortness of breath, cough  Gastrointestinal: No nausea, abdominal pain  Genitourinary: No incontinence, pain with urination  Musculoskeletal: +pain, swelling, decreased range of motion  Neurological: No headache, weakness  Psychiatric: No depression, anxiety  Endocrine: No weight gain, increased thirst  All other systems are comprehensively negative.    Physical Exam:  Vital Signs Last 24 Hrs  T(C): 36.7 (05 Jun 2024 07:54), Max: 36.7 (05 Jun 2024 07:54)  T(F): 98 (05 Jun 2024 07:54), Max: 98 (05 Jun 2024 07:54)  HR: 56 (05 Jun 2024 10:00) (45 - 75)  BP: 134/63 (05 Jun 2024 10:00) (101/51 - 162/77)  BP(mean): 90 (05 Jun 2024 10:00) (74 - 97)  RR: 11 (05 Jun 2024 10:00) (9 - 20)  SpO2: 98% (05 Jun 2024 10:00) (96% - 100%)  Parameters below as of 05 Jun 2024 08:00  Patient On (Oxygen Delivery Method): room air  General: NAD  HEENT: MMM  Neck: No JVD, no carotid bruit  Lungs: CTAB  CV: RRR, nl S1/S2, no M/R/G  Abdomen: S/NT/ND, +BS  Extremities: No LE edema, no cyanosis  Neuro: AAOx3, non-focal  Skin: No rash    Labs:    06-05    134<L>  |  99  |  38<H>  ----------------------------<  208<H>  4.5   |  27  |  0.98    Ca    8.0<L>      05 Jun 2024 05:40  Mg     2.3     06-04    TPro  6.5  /  Alb  3.5  /  TBili  0.5  /  DBili  x   /  AST  63<H>  /  ALT  169<H>  /  AlkPhos  56  06-04                        12.2   14.11 )-----------( 283      ( 05 Jun 2024 05:40 )             37.9     Telemetry: Sinus rhythm

## 2024-06-05 NOTE — PROGRESS NOTE ADULT - ATTENDING COMMENTS
78M with spinal stenosis, L clavicle fx in 2023, anxiety who presents s/p C1-C2 PCDF who was admitted to ICU for goal MAP >75    # Post-op monitoring  - MAP goals achieved  - Pt stable for transfer to floor   - pain management and antibiotics per ortho      plan d/w ortho team Overweight/Obesity

## 2024-06-05 NOTE — PHYSICAL THERAPY INITIAL EVALUATION ADULT - MANUAL MUSCLE TESTING RESULTS, REHAB EVAL
(B) UE and (B) LE >3+/5 upon functional assessment against gravity. within c-spine precautions
(B) UE and (B) LE >3+/5 upon functional assessment against gravity.

## 2024-06-05 NOTE — DIETITIAN INITIAL EVALUATION ADULT - OTHER INFO
Pt is a "78 year old male with a PMH of spinal stenosis, L clavicle fx in 2023, anxiety who presented to the ED after being sent by Dr. Richards for worsening b/l LE pain and weakness.  Is now s/p C1-C2 PCDF  Posterior Cervical Decompression and Fusion).  Admitted to ICU for BP monitoring with a goal MAP >75."    Visited pt at bedside this am. s/p C1-C2 PCDF and Bilateral cervical paraspinal muscle flap and complex closure by plastics on 6/4. Pt remains NPO at this time. Previously on regular diet with good appetite/intake noted. % of meals per nursing documentation. No food allergies. Denies N/V. +BM 6/3; bowel regimen rx. CBW on admisison 164#. 1+ BL foot/ankle edema noted. Pt reports UBW ~165#; denies weight changes. Skin: R 2nd toe wound. Diet now advanced to soft/bite size diet this afternoon. Will honor food preferences as able to optimize po intake/tolerance. BS elevated, likely 2/2 steroid administration. A1c pending. Will continue to monitor BS. Diet education not appropriate at this time. RD remains available and will continue to follow-up.

## 2024-06-05 NOTE — PHYSICAL THERAPY INITIAL EVALUATION ADULT - NSPTDISCHREC_GEN_A_CORE
Subacute Rehab vs HPT pending progress, patient verbalized preference to d/c home with HPT
patient states that he is adamantly against Subacute Rehab. Appears safe for d/c home with HPT, has DME, at this time, however if patient goes to OR, will plan for re-eval when indicated.

## 2024-06-05 NOTE — OCCUPATIONAL THERAPY INITIAL EVALUATION ADULT - DIAGNOSIS, OT EVAL
Patient with decreased ADL status and impairments with functional mobility.
Impaired ADLs, impaired functional activity tolerance

## 2024-06-05 NOTE — OCCUPATIONAL THERAPY INITIAL EVALUATION ADULT - NSOTDISCHREC_GEN_A_CORE
patient prefers home/Sub-acute Rehab
TBD pending progress, pt pending possible surgery 6/6 (ACDF)
You can access the FollowMyHealth Patient Portal offered by [facility] by registering at the following website: [email or web address]. By joining [employer]’s FollowMyHealth portal, you will also be able to view your health information using other applications (apps) compatible with our system.

## 2024-06-05 NOTE — PROGRESS NOTE ADULT - ASSESSMENT
The patient is a 78 year old male with a history of anxiety, GERD, spinal stenosis who presents with worsening bilateral leg pain, heaviness.     Plan:  - ECG with no evidence of ischemia or infarction  - Permissive hypertension as per ortho  - Ortho follow-up  - PT

## 2024-06-05 NOTE — DIETITIAN INITIAL EVALUATION ADULT - REASON
Understanding Rectal Bleeding    Rectal bleeding is when blood passes through your rectum and anus. It can happen with or without a bowel movement. Rectal bleeding may be a sign of a serious problem in your rectum, colon, or upper GI tract. Call your healthcare provider right away if you have any rectal bleeding.  The GI Tract  The gastrointestinal (GI) tract includes the mouth, esophagus, stomach, small intestine, large intestine (colon), rectum, and anus. The food you eat is digested as it passes through the GI tract. Solid waste leaves the body through the rectum.   Rectal bleeding and GI problems  The cause of rectal bleeding may be found in any region of the GI tract. The colon or rectum may be the site of your bleeding problem. Or, bleeding may be due to problems farther up the GI tract, such as in the small intestine, duodenum, or stomach.  Causes of rectal bleeding  Rectal bleeding causes include the following:  · Hemorrhoids (swollen veins in the rectum and anus)  · Fissures (tears in or near the anus)  · Diverticulosis (inflamed pockets in the colon wall)  · Infection  · Ischemia (low blood flow)  · Radiation damage  · Inflammatory bowel disease (Crohn's disease or ulcerative colitis)  · Ulcers in the upper GI tract and inflammation of the large intestine  · Abnormal tissue growths (tumors or polyps) in the GI tract  · A bulging rectum (also called a rectal prolapse)  · Abnormal blood vessels in the small intestine or in the colon  Common symptoms  Common symptoms include the following:  · Rectal pain, itching, or soreness  · Belly pain or epigastric pain  · Minor occasional drops of blood that appear on the stool or toilet paper, to greater amounts of stool that appear black or tarry   Rectal bleeding can also happen without pain.  Date Last Reviewed: 7/1/2016  © 4657-8218 [employer]. [address] [place] [address], [place] [address]. All rights reserved. This information is not intended as a  substitute for professional medical care. Always follow your healthcare professional's instructions.         Deferred at this time

## 2024-06-05 NOTE — DIETITIAN INITIAL EVALUATION ADULT - PERTINENT MEDS FT
MEDICATIONS  (STANDING):  acetaminophen     Tablet .. 650 milliGRAM(s) Oral every 6 hours  acetaminophen   IVPB .. 1000 milliGRAM(s) IV Intermittent once  clonazePAM  Tablet 0.5 milliGRAM(s) Oral three times a day  cyclobenzaprine 10 milliGRAM(s) Oral every 8 hours  dexAMETHasone  IVPB 10 milliGRAM(s) IV Intermittent every 8 hours  dextrose 10% Bolus 125 milliLiter(s) IV Bolus once  dextrose 5%. 1000 milliLiter(s) (50 mL/Hr) IV Continuous <Continuous>  dextrose 5%. 1000 milliLiter(s) (100 mL/Hr) IV Continuous <Continuous>  dextrose 50% Injectable 12.5 Gram(s) IV Push once  dextrose 50% Injectable 25 Gram(s) IV Push once  DULoxetine 60 milliGRAM(s) Oral daily  gabapentin 300 milliGRAM(s) Oral every 12 hours  glucagon  Injectable 1 milliGRAM(s) IntraMuscular once  HYDROmorphone  Injectable 0.5 milliGRAM(s) IV Push once  insulin lispro (ADMELOG) corrective regimen sliding scale   SubCutaneous at bedtime  insulin lispro (ADMELOG) corrective regimen sliding scale   SubCutaneous three times a day before meals  multivitamin 1 Tablet(s) Oral daily  norepinephrine Infusion 0.01 MICROgram(s)/kG/Min (1.4 mL/Hr) IV Continuous <Continuous>  polyethylene glycol 3350 17 Gram(s) Oral at bedtime  senna 2 Tablet(s) Oral at bedtime  vancomycin  IVPB 1000 milliGRAM(s) IV Intermittent every 12 hours    MEDICATIONS  (PRN):  dextrose Oral Gel 15 Gram(s) Oral once PRN Blood Glucose LESS THAN 70 milliGRAM(s)/deciliter  magnesium hydroxide Suspension 30 milliLiter(s) Oral daily PRN Constipation  ondansetron Injectable 4 milliGRAM(s) IV Push every 6 hours PRN Nausea and/or Vomiting  oxyCODONE    IR 5 milliGRAM(s) Oral every 4 hours PRN Moderate Pain (4 - 6)  oxyCODONE    IR 10 milliGRAM(s) Oral every 4 hours PRN Severe Pain (7 - 10)  traMADol 50 milliGRAM(s) Oral every 6 hours PRN Mild Pain (1 - 3)

## 2024-06-05 NOTE — PROGRESS NOTE ADULT - SUBJECTIVE AND OBJECTIVE BOX
Patient is a 78y old  Male who presents with a chief complaint of lower extremity weakness, inability to walk (05 Jun 2024 07:03)      SUBJECTIVE / OVERNIGHT EVENTS: Patient seen and examined at bedside. No acute events overnight. s/p ortho intervention on CSPINE and transferred to MICU for BP monitoring. TOV this morning. Has a left radial jacinta placed by anesthesia. No pain.    MEDICATIONS  (STANDING):  acetaminophen     Tablet .. 650 milliGRAM(s) Oral every 6 hours  acetaminophen   IVPB .. 1000 milliGRAM(s) IV Intermittent once  clonazePAM  Tablet 0.5 milliGRAM(s) Oral three times a day  cyclobenzaprine 10 milliGRAM(s) Oral every 8 hours  dexAMETHasone  IVPB 10 milliGRAM(s) IV Intermittent every 8 hours  dextrose 10% Bolus 125 milliLiter(s) IV Bolus once  dextrose 5%. 1000 milliLiter(s) (50 mL/Hr) IV Continuous <Continuous>  dextrose 5%. 1000 milliLiter(s) (100 mL/Hr) IV Continuous <Continuous>  dextrose 50% Injectable 25 Gram(s) IV Push once  dextrose 50% Injectable 12.5 Gram(s) IV Push once  DULoxetine 60 milliGRAM(s) Oral daily  gabapentin 300 milliGRAM(s) Oral every 12 hours  glucagon  Injectable 1 milliGRAM(s) IntraMuscular once  HYDROmorphone  Injectable 0.5 milliGRAM(s) IV Push once  insulin lispro (ADMELOG) corrective regimen sliding scale   SubCutaneous three times a day before meals  insulin lispro (ADMELOG) corrective regimen sliding scale   SubCutaneous at bedtime  multivitamin 1 Tablet(s) Oral daily  norepinephrine Infusion 0.01 MICROgram(s)/kG/Min (1.4 mL/Hr) IV Continuous <Continuous>  polyethylene glycol 3350 17 Gram(s) Oral at bedtime  senna 2 Tablet(s) Oral at bedtime  vancomycin  IVPB 1000 milliGRAM(s) IV Intermittent every 12 hours    MEDICATIONS  (PRN):  dextrose Oral Gel 15 Gram(s) Oral once PRN Blood Glucose LESS THAN 70 milliGRAM(s)/deciliter  magnesium hydroxide Suspension 30 milliLiter(s) Oral daily PRN Constipation  ondansetron Injectable 4 milliGRAM(s) IV Push every 6 hours PRN Nausea and/or Vomiting  oxyCODONE    IR 10 milliGRAM(s) Oral every 4 hours PRN Severe Pain (7 - 10)  oxyCODONE    IR 5 milliGRAM(s) Oral every 4 hours PRN Moderate Pain (4 - 6)  traMADol 50 milliGRAM(s) Oral every 6 hours PRN Mild Pain (1 - 3)      CAPILLARY BLOOD GLUCOSE        I&O's Summary    04 Jun 2024 07:01  -  05 Jun 2024 07:00  --------------------------------------------------------  IN: 1239 mL / OUT: 805 mL / NET: 434 mL    05 Jun 2024 07:01  -  05 Jun 2024 10:00  --------------------------------------------------------  IN: 0 mL / OUT: 100 mL / NET: -100 mL        PHYSICAL EXAM:  Vital Signs Last 24 Hrs  T(C): 36.7 (05 Jun 2024 07:54), Max: 36.7 (05 Jun 2024 07:54)  T(F): 98 (05 Jun 2024 07:54), Max: 98 (05 Jun 2024 07:54)  HR: 56 (05 Jun 2024 08:30) (45 - 75)  BP: 112/55 (05 Jun 2024 08:30) (101/51 - 162/77)  BP(mean): 79 (05 Jun 2024 08:30) (74 - 97)  RR: 10 (05 Jun 2024 08:30) (9 - 20)  SpO2: 97% (05 Jun 2024 08:30) (96% - 100%)    Parameters below as of 05 Jun 2024 08:00  Patient On (Oxygen Delivery Method): room air        GEN: male in NAD, appears comfortable, no diaphoresis  EYES: No scleral injection, EOMI  ENTM: neck supple & symmetric without tracheal deviation, moist membranes, no gross hearing impairment, +CCollar  CV: +S1/S2, no m/r/g, no abdominal bruit, no LE edema  RESP: breathing comfortably, no respiratory accessory muscle use, CTAB, no w/r/r  GI: normoactive BS, soft, NTND, no rebounding/guarding, no palpable masses    LABS:                        12.2   14.11 )-----------( 283      ( 05 Jun 2024 05:40 )             37.9     06-05    134<L>  |  99  |  38<H>  ----------------------------<  208<H>  4.5   |  27  |  0.98    Ca    8.0<L>      05 Jun 2024 05:40  Mg     2.3     06-04    TPro  6.5  /  Alb  3.5  /  TBili  0.5  /  DBili  x   /  AST  63<H>  /  ALT  169<H>  /  AlkPhos  56  06-04    PT/INR - ( 04 Jun 2024 04:46 )   PT: 11.1 sec;   INR: 0.95 ratio         PTT - ( 04 Jun 2024 04:46 )  PTT:24.2 sec      Urinalysis Basic - ( 05 Jun 2024 05:40 )    Color: x / Appearance: x / SG: x / pH: x  Gluc: 208 mg/dL / Ketone: x  / Bili: x / Urobili: x   Blood: x / Protein: x / Nitrite: x   Leuk Esterase: x / RBC: x / WBC x   Sq Epi: x / Non Sq Epi: x / Bacteria: x          RADIOLOGY & ADDITIONAL TESTS:  Results Reviewed:   Imaging Personally Reviewed:  Electrocardiogram Personally Reviewed:    COORDINATION OF CARE:  Care Discussed with Consultants/Other Providers [Y/N]:  Prior or Outpatient Records Reviewed [Y/N]:

## 2024-06-05 NOTE — PHYSICAL THERAPY INITIAL EVALUATION ADULT - GAIT TRAINING, PT EVAL
GOAL: Patient will ambulate independently x 200 feet with use of rolling walker (2 weeks)
GOAL: Patient will ambulate independently x 200 feet with use of rolling walker (2 weeks)

## 2024-06-05 NOTE — DIETITIAN INITIAL EVALUATION ADULT - PERTINENT LABORATORY DATA
06-05    134<L>  |  99  |  38<H>  ----------------------------<  208<H>  4.5   |  27  |  0.98    Ca    8.0<L>      05 Jun 2024 05:40  Mg     2.3     06-04    TPro  6.5  /  Alb  3.5  /  TBili  0.5  /  DBili  x   /  AST  63<H>  /  ALT  169<H>  /  AlkPhos  56  06-04

## 2024-06-05 NOTE — PHYSICAL THERAPY INITIAL EVALUATION ADULT - ADDITIONAL COMMENTS
Patient reports that he lives with his wife in a private house, [address], bed/bath upstairs. Independent with ADLs/ambulation typically however has been using a cane for the past "few weeks," and a rolling walker for the past 1 week.
Patient reports that he lives with his wife in a private house, [address], bed/bath upstairs. Independent with ADLs/ambulation typically however has been using a cane for the past "few weeks," and a rolling walker for the past 1 week.

## 2024-06-05 NOTE — OCCUPATIONAL THERAPY INITIAL EVALUATION ADULT - PERTINENT HX OF CURRENT PROBLEM, REHAB EVAL
77yo M PMH spinal stenosis, L clavical fx in 12/2023, anxiety presenting to the ED sent in by Dr. Richards for worsening bilateral lower extremity pain. Pt has multi-level stenosis and has been experiencing mild weakness in his legs for the past few years, which has gotten acutely worse within the last week. Pt endorses immediate leg heaviness and fatigue on ambulation, with frequent cramps. Has been going to PT that  has been managing symptoms up until recently. Denies any trauma to the area. Denies back pain, chest pain, fevers, chills, tingling or numbness in the lower extremities, confusion, or vision changes.     As per MD note (Dr. Collins, 6/1/24)- "PT/OT discussed with ortho. ok to start at this time given improvement of symptoms."    As per orthopedics note (Dr. Lawton, 6/1/24)- "Pt with significant improvement in strength. At this time, no plan for OR this weekend, likely plan for OR thursday (6/6) for C1-C2 ACDF."
77 y/o male s/p C1-C2 PCDF 6/4/24.

## 2024-06-05 NOTE — PROGRESS NOTE ADULT - ASSESSMENT
78 year old male with a PMH of spinal stenosis, L clavicle fx in 2023, anxiety who presented to the ED after being sent by Dr. Richards for worsening b/l LE pain and weakness.  Is now s/p C1-C2 PCDF  Posterior Cervical Decompression and Fusion).  Admitted to ICU for BP monitoring with a goal MAP >75.    Neuro  -Multimodal analgesia with tylenol, flexeril, oxycodone  -Decadron 10 mg q8, gabapentin q12 as per ortho      Cardio  - Monitor in ICU post operatively for 24 hrs.  Goal MAP >75.  - OFF levophed    Pulm  -no active issues    GI  -advance to soft and bite sized diet   -Bowel regimen with senna while receiving narcotics     Renal/  - Porter removed this AM  -no active issues     ID  -Abx prophylaxis with vanco while drains in place    Heme  -SCDs     Endo  - -208 likely 2/2 steroid administration  - F/u A1c   - Placed on LDISS      Dispo  -stable for downgrade to floor after 24 hr observation to maintain MAP >74

## 2024-06-05 NOTE — OCCUPATIONAL THERAPY INITIAL EVALUATION ADULT - MD ORDER
OT evaluate and treat. Cervical collar at all times.
MD orders for OT received, chart reviewed and contents noted. RN consulted prior to session, stated pt OK to participate.

## 2024-06-05 NOTE — DIETITIAN INITIAL EVALUATION ADULT - ORAL INTAKE PTA/DIET HISTORY
Pt reports good appetite/intake PTA. Typically consumes 3 meals/day. Pt prepares own meals at home. Regular diet. Does not consume oral nutritional supplements.

## 2024-06-05 NOTE — PHYSICAL THERAPY INITIAL EVALUATION ADULT - BED MOBILITY TRAINING, PT EVAL
GOAL: Patient will complete bed mobility independently (rolling, supine<>sit) (2 weeks)
GOAL: Patient will complete bed mobility independently (rolling, supine<>sit) (2 weeks)

## 2024-06-05 NOTE — OCCUPATIONAL THERAPY INITIAL EVALUATION ADULT - ORIENTATION, REHAB EVAL
oriented to person, place, time and situation
grossly to date/oriented to person, place, time and situation

## 2024-06-06 LAB
ALBUMIN SERPL ELPH-MCNC: 2.9 G/DL — LOW (ref 3.3–5)
ALP SERPL-CCNC: 45 U/L — SIGNIFICANT CHANGE UP (ref 40–120)
ALT FLD-CCNC: 147 U/L — HIGH (ref 12–78)
ANION GAP SERPL CALC-SCNC: 5 MMOL/L — SIGNIFICANT CHANGE UP (ref 5–17)
AST SERPL-CCNC: 45 U/L — HIGH (ref 15–37)
BILIRUB SERPL-MCNC: 0.5 MG/DL — SIGNIFICANT CHANGE UP (ref 0.2–1.2)
BUN SERPL-MCNC: 41 MG/DL — HIGH (ref 7–23)
CALCIUM SERPL-MCNC: 8.2 MG/DL — LOW (ref 8.5–10.1)
CHLORIDE SERPL-SCNC: 98 MMOL/L — SIGNIFICANT CHANGE UP (ref 96–108)
CO2 SERPL-SCNC: 29 MMOL/L — SIGNIFICANT CHANGE UP (ref 22–31)
CREAT SERPL-MCNC: 1.1 MG/DL — SIGNIFICANT CHANGE UP (ref 0.5–1.3)
EGFR: 68 ML/MIN/1.73M2 — SIGNIFICANT CHANGE UP
GLUCOSE SERPL-MCNC: 180 MG/DL — HIGH (ref 70–99)
HCT VFR BLD CALC: 36 % — LOW (ref 39–50)
HGB BLD-MCNC: 11.8 G/DL — LOW (ref 13–17)
MAGNESIUM SERPL-MCNC: 2.3 MG/DL — SIGNIFICANT CHANGE UP (ref 1.6–2.6)
MCHC RBC-ENTMCNC: 28.9 PG — SIGNIFICANT CHANGE UP (ref 27–34)
MCHC RBC-ENTMCNC: 32.8 GM/DL — SIGNIFICANT CHANGE UP (ref 32–36)
MCV RBC AUTO: 88.2 FL — SIGNIFICANT CHANGE UP (ref 80–100)
NRBC # BLD: 0 /100 WBCS — SIGNIFICANT CHANGE UP (ref 0–0)
PHOSPHATE SERPL-MCNC: 4 MG/DL — SIGNIFICANT CHANGE UP (ref 2.5–4.5)
PLATELET # BLD AUTO: 287 K/UL — SIGNIFICANT CHANGE UP (ref 150–400)
POTASSIUM SERPL-MCNC: 4.7 MMOL/L — SIGNIFICANT CHANGE UP (ref 3.5–5.3)
POTASSIUM SERPL-SCNC: 4.7 MMOL/L — SIGNIFICANT CHANGE UP (ref 3.5–5.3)
PROT SERPL-MCNC: 5.5 G/DL — LOW (ref 6–8.3)
RBC # BLD: 4.08 M/UL — LOW (ref 4.2–5.8)
RBC # FLD: 13.2 % — SIGNIFICANT CHANGE UP (ref 10.3–14.5)
SODIUM SERPL-SCNC: 132 MMOL/L — LOW (ref 135–145)
VANCOMYCIN TROUGH SERPL-MCNC: 12.9 UG/ML — SIGNIFICANT CHANGE UP (ref 10–20)
WBC # BLD: 10.41 K/UL — SIGNIFICANT CHANGE UP (ref 3.8–10.5)
WBC # FLD AUTO: 10.41 K/UL — SIGNIFICANT CHANGE UP (ref 3.8–10.5)

## 2024-06-06 PROCEDURE — 99233 SBSQ HOSP IP/OBS HIGH 50: CPT

## 2024-06-06 RX ORDER — MUPIROCIN 20 MG/G
1 OINTMENT TOPICAL
Refills: 0 | Status: DISCONTINUED | OUTPATIENT
Start: 2024-06-06 | End: 2024-06-08

## 2024-06-06 RX ORDER — FAMOTIDINE 10 MG/ML
20 INJECTION INTRAVENOUS ONCE
Refills: 0 | Status: COMPLETED | OUTPATIENT
Start: 2024-06-06 | End: 2024-06-06

## 2024-06-06 RX ADMIN — Medication 650 MILLIGRAM(S): at 23:36

## 2024-06-06 RX ADMIN — CYCLOBENZAPRINE HYDROCHLORIDE 10 MILLIGRAM(S): 10 TABLET, FILM COATED ORAL at 12:53

## 2024-06-06 RX ADMIN — MUPIROCIN 1 APPLICATION(S): 20 OINTMENT TOPICAL at 17:57

## 2024-06-06 RX ADMIN — SENNA PLUS 2 TABLET(S): 8.6 TABLET ORAL at 21:05

## 2024-06-06 RX ADMIN — Medication 650 MILLIGRAM(S): at 17:56

## 2024-06-06 RX ADMIN — ONDANSETRON 4 MILLIGRAM(S): 8 TABLET, FILM COATED ORAL at 20:29

## 2024-06-06 RX ADMIN — Medication 250 MILLIGRAM(S): at 06:49

## 2024-06-06 RX ADMIN — POLYETHYLENE GLYCOL 3350 17 GRAM(S): 17 POWDER, FOR SOLUTION ORAL at 21:05

## 2024-06-06 RX ADMIN — Medication 1 TABLET(S): at 12:53

## 2024-06-06 RX ADMIN — Medication 0.5 MILLIGRAM(S): at 23:37

## 2024-06-06 RX ADMIN — CYCLOBENZAPRINE HYDROCHLORIDE 10 MILLIGRAM(S): 10 TABLET, FILM COATED ORAL at 21:05

## 2024-06-06 RX ADMIN — GABAPENTIN 300 MILLIGRAM(S): 400 CAPSULE ORAL at 17:56

## 2024-06-06 RX ADMIN — DULOXETINE HYDROCHLORIDE 60 MILLIGRAM(S): 30 CAPSULE, DELAYED RELEASE ORAL at 12:53

## 2024-06-06 RX ADMIN — GABAPENTIN 300 MILLIGRAM(S): 400 CAPSULE ORAL at 05:39

## 2024-06-06 RX ADMIN — Medication 650 MILLIGRAM(S): at 05:39

## 2024-06-06 RX ADMIN — FAMOTIDINE 20 MILLIGRAM(S): 10 INJECTION INTRAVENOUS at 21:05

## 2024-06-06 RX ADMIN — Medication 250 MILLIGRAM(S): at 18:10

## 2024-06-06 RX ADMIN — CYCLOBENZAPRINE HYDROCHLORIDE 10 MILLIGRAM(S): 10 TABLET, FILM COATED ORAL at 05:39

## 2024-06-06 RX ADMIN — Medication 650 MILLIGRAM(S): at 12:55

## 2024-06-06 NOTE — PROGRESS NOTE ADULT - ASSESSMENT
The patient is a 78 year old male with a history of anxiety, GERD, spinal stenosis who presents with worsening bilateral leg pain, heaviness.     Plan:  - ECG with no evidence of ischemia or infarction  - Ortho follow-up  - PT  - Discharge planning

## 2024-06-06 NOTE — PROGRESS NOTE ADULT - ASSESSMENT
79yo M PMH spinal stenosis presents to ED with bilateral lower extremity weakness. Admitted for bilateral lower extremity weakness, need for MRI for further evaluation.     BL lower extremity weakness   - MRI reviewed with degenerative changes multilevel  - ortho eval appreciated. s/p C1-C2 PCDF and Bilateral cervical paraspinal muscle flap and complex closure by plastics on 6/4  - con gabapentin/flexeril  - PT/OT eval noted., patient refusing HETAL and wants to go home  - C-Collar per ortho (likely until OP follow up)  - Pending removal of bilateral SERGEI drains (monitor output)  - Vanc IV until drains are out. Last vanc trough 12 which is appropriate    Anxiety   - cont duloxetine   - not using klonopin at home (ordered PRN    DVT proph: SCDs (defer chemical per ortho)    Dispo: home with c-collar once drains are out

## 2024-06-06 NOTE — PROGRESS NOTE ADULT - SUBJECTIVE AND OBJECTIVE BOX
Pt downgraded from ICU to medicine floor last night. Seen at bedside this AM. No acute complaints, pain is well managed. Denies new numbness or tingling, fevers, chills, CP, SOB, N/V/D, urinary/bowel incontinence.    Vital Signs (24 Hrs):  T(C): 36.4 (06-05-24 @ 04:36), Max: 36.6 (06-04-24 @ 20:45)  HR: 60 (06-05-24 @ 06:30) (45 - 75)  BP: 126/60 (06-05-24 @ 06:15) (101/51 - 162/77)  RR: 10 (06-05-24 @ 06:30) (9 - 20)  SpO2: 99% (06-05-24 @ 06:30) (96% - 100%)  Wt(kg): --    LABS:                          12.2   14.11 )-----------( 283      ( 05 Jun 2024 05:40 )             37.9     06-04    133<L>  |  102  |  38<H>  ----------------------------<  204<H>  4.2   |  27  |  1.00    Ca    8.1<L>      04 Jun 2024 22:25  Mg     2.3     06-04    TPro  6.5  /  Alb  3.5  /  TBili  0.5  /  DBili  x   /  AST  63<H>  /  ALT  169<H>  /  AlkPhos  56  06-04    LIVER FUNCTIONS - ( 04 Jun 2024 04:46 )  Alb: 3.5 g/dL / Pro: 6.5 g/dL / ALK PHOS: 56 U/L / ALT: 169 U/L / AST: 63 U/L / GGT: x           PT/INR - ( 04 Jun 2024 04:46 )   PT: 11.1 sec;   INR: 0.95 ratio         PTT - ( 04 Jun 2024 04:46 )  PTT:24.2 sec    Physical Exam:   General: NAD, patient laying in bed comfortably  Calves nontender  Compartments compressible, soft    Spine:  Dressings C/D/I, hard collar in place  2 SERGEI drains in place    MOTOR EXAM:                          Elbow Flex (C5)     Wrist Ext (C6)     Elbow Ext (C7)      Finger Flex (C8)    Finger Abduction (T1)  RIGHT                 5/5                         5/5                         5/5                          5/5                              5/5  LEFT                    5/5                         5/5                         5/5                          5/5                              5/5                         Hip Flex (L2)      Knee Ext (L3)      Ank Dorsiflex (L4)     Hallux Ext (L5)     Ank PlantarFlex (S1)  RIGHT               5/5                      5/5                         2/5                            1/5                           5/5  LEFT                  5/5                      4+/5                          4/5                            5/5                           5/5  *BL Foot Drop - R DF To about 10-15 degrees below neutral, 2/5. L DF to about neutral, 4/5 at neutral    SENSORY EXAM:                        C5      C6      C7      C8       T1          RIGHT          2         2        2         2         2          (0=absent, 1=impaired, 2=normal, NT=not testable)  LEFT             2         2        2         2         2          (0=absent, 1=impaired, 2=normal, NT=not testable)                      L2        L3       L4      L5       S1          RIGHT        2          2         2        2        2           (0=absent, 1=impaired, 2=normal, NT=not testable)  LEFT           2          2         2        2        2           (0=absent, 1=impaired, 2=normal, NT=not testable)    No pain with SLR, no clonus, no babinksi, no colin    A/P: 78M POD2 C1-C2 PCDF     WBAT, hard C-collar on at all times  PT/OT  Analgesics  Hold DVT chemical ppx, SCDs ok  Please record drain outputs  Remove quiroz when ambulating  Incentive spirometry  Dispo: pending PT eval and improvement in clinical course  Appreciate medical recs  Appreciate ICU management  Will discuss plan with Dr. Richards and notify primary team of any changes to plan     Pt downgraded from ICU to medicine floor last night. Seen at bedside this AM. No acute complaints, pain is well managed. Denies new numbness or tingling, fevers, chills, CP, SOB, N/V/D, urinary/bowel incontinence.     Vital Signs (24 Hrs):  T(C): 36.4 (06-06-24 @ 04:50), Max: 36.7 (06-05-24 @ 07:54)  HR: 68 (06-06-24 @ 04:50) (55 - 96)  BP: 116/64 (06-06-24 @ 04:50) (83/45 - 148/67)  RR: 20 (06-06-24 @ 04:50) (9 - 29)  SpO2: 93% (06-06-24 @ 04:50) (93% - 100%)  Wt(kg): --    LABS:                          11.8   10.41 )-----------( 287      ( 06 Jun 2024 05:40 )             36.0     06-06    132<L>  |  98  |  41<H>  ----------------------------<  180<H>  4.7   |  29  |  1.10    Ca    8.2<L>      06 Jun 2024 05:40  Phos  4.0     06-06  Mg     2.3     06-06    TPro  5.5<L>  /  Alb  2.9<L>  /  TBili  0.5  /  DBili  x   /  AST  45<H>  /  ALT  147<H>  /  AlkPhos  45  06-06    LIVER FUNCTIONS - ( 06 Jun 2024 05:40 )  Alb: 2.9 g/dL / Pro: 5.5 g/dL / ALK PHOS: 45 U/L / ALT: 147 U/L / AST: 45 U/L / GGT: x               Physical Exam:   General: NAD, patient laying in bed comfortably  Calves nontender  Compartments compressible, soft    Spine:  Dressings C/D/I, hard collar in place  2 SERGEI drains in place    MOTOR EXAM:                          Elbow Flex (C5)     Wrist Ext (C6)     Elbow Ext (C7)      Finger Flex (C8)    Finger Abduction (T1)  RIGHT                 5/5                         5/5                         5/5                          5/5                              5/5  LEFT                    5/5                         5/5                         5/5                          5/5                              5/5                         Hip Flex (L2)      Knee Ext (L3)      Ank Dorsiflex (L4)     Hallux Ext (L5)     Ank PlantarFlex (S1)  RIGHT               5/5                      5/5                         2/5                            1/5                           5/5  LEFT                  5/5                      4+/5                          4/5                            5/5                           5/5  *BL Foot Drop - R DF To about 10-15 degrees below neutral, 2/5. L DF to about neutral, 4/5 at neutral    SENSORY EXAM:                        C5      C6      C7      C8       T1          RIGHT          2         2        2         2         2          (0=absent, 1=impaired, 2=normal, NT=not testable)  LEFT             2         2        2         2         2          (0=absent, 1=impaired, 2=normal, NT=not testable)                      L2        L3       L4      L5       S1          RIGHT        2          2         2        2        2           (0=absent, 1=impaired, 2=normal, NT=not testable)  LEFT           2          2         2        2        2           (0=absent, 1=impaired, 2=normal, NT=not testable)    No pain with SLR, no clonus, no babinksi, no colin    A/P: 78M POD2 C1-C2 PCDF     WBAT, hard C-collar on at all times  PT/OT  Analgesics  Hold DVT chemical ppx, SCDs ok  Please record drain outputs  Remove quiroz when ambulating  Incentive spirometry  Dispo: pending PT eval and improvement in clinical course  Appreciate medical recs  Appreciate ICU management  Will discuss plan with Dr. Richards and notify primary team of any changes to plan

## 2024-06-06 NOTE — PHARMACOTHERAPY INTERVENTION NOTE - COMMENTS
As identified in Age Friendly safe prescribing for older adults report, patient is a 78 year old female on oxycodone 5mg q6hrs prn moderate pain. Discussed w/ Dr. Collins that patient hasn't received any doses for 3 days and recommended discontinuation. MD agreed and order was discontinued 
Admission counseling - discussed current and new medications with the patient at bedside including indications, directions, and adverse effects to monitor. Patient verbalized understanding and had no further questions.
Patient is a 79 yo male being treated with IV vancomycin for post-op ppx s/p ACDF. Vancomycin trough resulted 12.9 today with therapeutic AUC ~513 (goal 400-600). Discussed with Dr. Qureshi and recommended additional trough for close monitoring. Accepted and order was entered per discussion. Next trough due 6/8 at 05:00.     Additionally, recommended nasal mupirocin for S. aureus decolonization as MRSA/MSSA PCR resulted positive with MSSA. Accepted and order was entered per discussion.
Utilized the age friendly 65+ report to identify patient on standing clonazepam 0.5 mg TID. Discussed with Dr. Qureshi switching the order to PRN or possibly discontinuing order since the 6/5 progress note mentions that patient does not use clonazepam at home. Per discussion, order switched to clonazepam 0.5 mg TID PRN for anxiety by MD.

## 2024-06-06 NOTE — PROGRESS NOTE ADULT - SUBJECTIVE AND OBJECTIVE BOX
Patient is a 78y old  Male who presents with a chief complaint of lower extremity weakness, inability to walk (06 Jun 2024 06:59)      SUBJECTIVE / OVERNIGHT EVENTS: Patient seen and examined at bedside. No acute events overnight. Without complaints currently. Still with 2 SERGEI drains    MEDICATIONS  (STANDING):  acetaminophen     Tablet .. 650 milliGRAM(s) Oral every 6 hours  cyclobenzaprine 10 milliGRAM(s) Oral every 8 hours  DULoxetine 60 milliGRAM(s) Oral daily  gabapentin 300 milliGRAM(s) Oral every 12 hours  multivitamin 1 Tablet(s) Oral daily  polyethylene glycol 3350 17 Gram(s) Oral at bedtime  senna 2 Tablet(s) Oral at bedtime  vancomycin  IVPB 1000 milliGRAM(s) IV Intermittent every 12 hours    MEDICATIONS  (PRN):  clonazePAM  Tablet 0.5 milliGRAM(s) Oral three times a day PRN anxiety  magnesium hydroxide Suspension 30 milliLiter(s) Oral daily PRN Constipation  ondansetron Injectable 4 milliGRAM(s) IV Push every 6 hours PRN Nausea and/or Vomiting  oxyCODONE    IR 5 milliGRAM(s) Oral every 4 hours PRN Moderate Pain (4 - 6)  oxyCODONE    IR 10 milliGRAM(s) Oral every 4 hours PRN Severe Pain (7 - 10)  traMADol 50 milliGRAM(s) Oral every 6 hours PRN Mild Pain (1 - 3)      CAPILLARY BLOOD GLUCOSE      POCT Blood Glucose.: 196 mg/dL (05 Jun 2024 21:24)  POCT Blood Glucose.: 134 mg/dL (05 Jun 2024 18:00)  POCT Blood Glucose.: 139 mg/dL (05 Jun 2024 12:47)  POCT Blood Glucose.: 161 mg/dL (05 Jun 2024 11:40)    I&O's Summary    05 Jun 2024 07:01  -  06 Jun 2024 07:00  --------------------------------------------------------  IN: 257 mL / OUT: 1595 mL / NET: -1338 mL        PHYSICAL EXAM:  Vital Signs Last 24 Hrs  T(C): 36.4 (06 Jun 2024 04:50), Max: 36.5 (05 Jun 2024 20:36)  T(F): 97.6 (06 Jun 2024 04:50), Max: 97.7 (05 Jun 2024 20:36)  HR: 68 (06 Jun 2024 04:50) (57 - 96)  BP: 116/64 (06 Jun 2024 04:50) (83/45 - 148/67)  BP(mean): 88 (05 Jun 2024 20:00) (58 - 97)  RR: 20 (06 Jun 2024 04:50) (9 - 29)  SpO2: 93% (06 Jun 2024 04:50) (93% - 100%)    Parameters below as of 06 Jun 2024 04:50  Patient On (Oxygen Delivery Method): room air        GEN: male in NAD, appears comfortable, no diaphoresis  EYES: No scleral injection, EOMI  ENTM: neck supple & symmetric without tracheal deviation, moist membranes, no gross hearing impairment, thyroid gland not enlarged; +CCollar  CV: +S1/S2, no m/r/g, no abdominal bruit, no LE edema  RESP: breathing comfortably, no respiratory accessory muscle use, CTAB, no w/r/r  GI: normoactive BS, soft, NTND, no rebounding/guarding, no palpable masses    LABS:                        11.8   10.41 )-----------( 287      ( 06 Jun 2024 05:40 )             36.0     06-06    132<L>  |  98  |  41<H>  ----------------------------<  180<H>  4.7   |  29  |  1.10    Ca    8.2<L>      06 Jun 2024 05:40  Phos  4.0     06-06  Mg     2.3     06-06    TPro  5.5<L>  /  Alb  2.9<L>  /  TBili  0.5  /  DBili  x   /  AST  45<H>  /  ALT  147<H>  /  AlkPhos  45  06-06          Urinalysis Basic - ( 06 Jun 2024 05:40 )    Color: x / Appearance: x / SG: x / pH: x  Gluc: 180 mg/dL / Ketone: x  / Bili: x / Urobili: x   Blood: x / Protein: x / Nitrite: x   Leuk Esterase: x / RBC: x / WBC x   Sq Epi: x / Non Sq Epi: x / Bacteria: x          RADIOLOGY & ADDITIONAL TESTS:  Results Reviewed:   Imaging Personally Reviewed:  Electrocardiogram Personally Reviewed:    COORDINATION OF CARE:  Care Discussed with Consultants/Other Providers [Y/N]:  Prior or Outpatient Records Reviewed [Y/N]:

## 2024-06-06 NOTE — SOCIAL WORK PROGRESS NOTE - NSSWPROGRESSNOTE_GEN_ALL_CORE
SW consult reviewed. Per PT, recc. home PT vs. [facility]. Pt refusing home PT. SW to follow and remain available for any needs.

## 2024-06-06 NOTE — PROGRESS NOTE ADULT - SUBJECTIVE AND OBJECTIVE BOX
Chief Complaint: Leg pain    Interval Events: No events overnight.    Review of Systems:  General: No fevers, chills, weight gain  Skin: No rashes, color changes  Cardiovascular: No chest pain, orthopnea  Respiratory: No shortness of breath, cough  Gastrointestinal: No nausea, abdominal pain  Genitourinary: No incontinence, pain with urination  Musculoskeletal: +pain, swelling, decreased range of motion  Neurological: No headache, weakness  Psychiatric: No depression, anxiety  Endocrine: No weight gain, increased thirst  All other systems are comprehensively negative.    Physical Exam:  Vital Signs Last 24 Hrs  T(C): 36.4 (06 Jun 2024 04:50), Max: 36.5 (05 Jun 2024 20:36)  T(F): 97.6 (06 Jun 2024 04:50), Max: 97.7 (05 Jun 2024 20:36)  HR: 68 (06 Jun 2024 04:50) (56 - 96)  BP: 116/64 (06 Jun 2024 04:50) (83/45 - 148/67)  BP(mean): 88 (05 Jun 2024 20:00) (58 - 97)  RR: 20 (06 Jun 2024 04:50) (9 - 29)  SpO2: 93% (06 Jun 2024 04:50) (93% - 100%)  Parameters below as of 06 Jun 2024 04:50  Patient On (Oxygen Delivery Method): room air  General: NAD  HEENT: MMM  Neck: No JVD, no carotid bruit  Lungs: CTAB  CV: RRR, nl S1/S2, no M/R/G  Abdomen: S/NT/ND, +BS  Extremities: No LE edema, no cyanosis  Neuro: AAOx3, non-focal  Skin: No rash    Labs:    06-06    132<L>  |  98  |  41<H>  ----------------------------<  180<H>  4.7   |  29  |  1.10    Ca    8.2<L>      06 Jun 2024 05:40  Phos  4.0     06-06  Mg     2.3     06-06    TPro  5.5<L>  /  Alb  2.9<L>  /  TBili  0.5  /  DBili  x   /  AST  45<H>  /  ALT  147<H>  /  AlkPhos  45  06-06                        11.8   10.41 )-----------( 287      ( 06 Jun 2024 05:40 )             36.0     Telemetry: Sinus rhythm

## 2024-06-06 NOTE — PHARMACOTHERAPY INTERVENTION NOTE - PROVIDER CONTACTED
Discharge instructions given sebastian RIVERA
Lemuel Qureshi
Patient provided with PB crackers
Lemuel Qureshi

## 2024-06-07 ENCOUNTER — TRANSCRIPTION ENCOUNTER (OUTPATIENT)
Age: 79
End: 2024-06-07

## 2024-06-07 LAB
ANION GAP SERPL CALC-SCNC: 5 MMOL/L — SIGNIFICANT CHANGE UP (ref 5–17)
BUN SERPL-MCNC: 53 MG/DL — HIGH (ref 7–23)
CALCIUM SERPL-MCNC: 7.7 MG/DL — LOW (ref 8.5–10.1)
CHLORIDE SERPL-SCNC: 97 MMOL/L — SIGNIFICANT CHANGE UP (ref 96–108)
CO2 SERPL-SCNC: 30 MMOL/L — SIGNIFICANT CHANGE UP (ref 22–31)
CREAT SERPL-MCNC: 1.1 MG/DL — SIGNIFICANT CHANGE UP (ref 0.5–1.3)
EGFR: 68 ML/MIN/1.73M2 — SIGNIFICANT CHANGE UP
GLUCOSE SERPL-MCNC: 123 MG/DL — HIGH (ref 70–99)
HCT VFR BLD CALC: 34.9 % — LOW (ref 39–50)
HGB BLD-MCNC: 11.6 G/DL — LOW (ref 13–17)
MCHC RBC-ENTMCNC: 29.2 PG — SIGNIFICANT CHANGE UP (ref 27–34)
MCHC RBC-ENTMCNC: 33.2 GM/DL — SIGNIFICANT CHANGE UP (ref 32–36)
MCV RBC AUTO: 87.9 FL — SIGNIFICANT CHANGE UP (ref 80–100)
NRBC # BLD: 0 /100 WBCS — SIGNIFICANT CHANGE UP (ref 0–0)
PLATELET # BLD AUTO: 289 K/UL — SIGNIFICANT CHANGE UP (ref 150–400)
POTASSIUM SERPL-MCNC: 4.2 MMOL/L — SIGNIFICANT CHANGE UP (ref 3.5–5.3)
POTASSIUM SERPL-SCNC: 4.2 MMOL/L — SIGNIFICANT CHANGE UP (ref 3.5–5.3)
RBC # BLD: 3.97 M/UL — LOW (ref 4.2–5.8)
RBC # FLD: 13.1 % — SIGNIFICANT CHANGE UP (ref 10.3–14.5)
SODIUM SERPL-SCNC: 132 MMOL/L — LOW (ref 135–145)
WBC # BLD: 16.22 K/UL — HIGH (ref 3.8–10.5)
WBC # FLD AUTO: 16.22 K/UL — HIGH (ref 3.8–10.5)

## 2024-06-07 PROCEDURE — 99233 SBSQ HOSP IP/OBS HIGH 50: CPT

## 2024-06-07 RX ADMIN — Medication 1 TABLET(S): at 11:42

## 2024-06-07 RX ADMIN — MUPIROCIN 1 APPLICATION(S): 20 OINTMENT TOPICAL at 06:12

## 2024-06-07 RX ADMIN — DULOXETINE HYDROCHLORIDE 60 MILLIGRAM(S): 30 CAPSULE, DELAYED RELEASE ORAL at 11:42

## 2024-06-07 RX ADMIN — CYCLOBENZAPRINE HYDROCHLORIDE 10 MILLIGRAM(S): 10 TABLET, FILM COATED ORAL at 21:10

## 2024-06-07 RX ADMIN — Medication 650 MILLIGRAM(S): at 11:45

## 2024-06-07 RX ADMIN — GABAPENTIN 300 MILLIGRAM(S): 400 CAPSULE ORAL at 06:13

## 2024-06-07 RX ADMIN — Medication 650 MILLIGRAM(S): at 17:05

## 2024-06-07 RX ADMIN — CYCLOBENZAPRINE HYDROCHLORIDE 10 MILLIGRAM(S): 10 TABLET, FILM COATED ORAL at 06:14

## 2024-06-07 RX ADMIN — SENNA PLUS 2 TABLET(S): 8.6 TABLET ORAL at 21:11

## 2024-06-07 RX ADMIN — Medication 650 MILLIGRAM(S): at 06:46

## 2024-06-07 RX ADMIN — GABAPENTIN 300 MILLIGRAM(S): 400 CAPSULE ORAL at 17:02

## 2024-06-07 RX ADMIN — Medication 650 MILLIGRAM(S): at 17:01

## 2024-06-07 RX ADMIN — Medication 650 MILLIGRAM(S): at 06:15

## 2024-06-07 RX ADMIN — CYCLOBENZAPRINE HYDROCHLORIDE 10 MILLIGRAM(S): 10 TABLET, FILM COATED ORAL at 13:15

## 2024-06-07 RX ADMIN — Medication 650 MILLIGRAM(S): at 11:42

## 2024-06-07 RX ADMIN — Medication 250 MILLIGRAM(S): at 06:12

## 2024-06-07 RX ADMIN — Medication 650 MILLIGRAM(S): at 00:06

## 2024-06-07 RX ADMIN — MUPIROCIN 1 APPLICATION(S): 20 OINTMENT TOPICAL at 17:02

## 2024-06-07 RX ADMIN — Medication 250 MILLIGRAM(S): at 17:01

## 2024-06-07 NOTE — CAREGIVER ENGAGEMENT NOTE - CAREGIVER OUTREACH NOTES - FREE TEXT
SW met with pt and pt spouse at bedside. SW discussed accepting SARs as pt is now in agreement with plan for [facility]. Pt accepting bed offer at [facility]. Per MD, may be Saturday dc. [facility] able to accept pt and can offer private room. SW to follow and remain available for any needs.

## 2024-06-07 NOTE — PROGRESS NOTE ADULT - SUBJECTIVE AND OBJECTIVE BOX
Patient is a 78y old  Male who presents with a chief complaint of b/l lower extremity weakness, inability to walk.      INTERVAL HPI/OVERNIGHT EVENTS: Pt states he still feels overall weakness in LEs (worse on right) with possibly gradual improvement. Neck pain is well-controlled. Pt denies fever, chills, SOB, CP, abd pain, n/v, dysuria, cough.    MEDICATIONS  (STANDING):  cyclobenzaprine 10 milliGRAM(s) Oral every 8 hours  DULoxetine 60 milliGRAM(s) Oral daily  gabapentin 300 milliGRAM(s) Oral every 12 hours  multivitamin 1 Tablet(s) Oral daily  mupirocin 2% Nasal 1 Application(s) Both Nostrils two times a day  polyethylene glycol 3350 17 Gram(s) Oral at bedtime  senna 2 Tablet(s) Oral at bedtime  vancomycin  IVPB 1000 milliGRAM(s) IV Intermittent every 12 hours    MEDICATIONS  (PRN):  clonazePAM  Tablet 0.5 milliGRAM(s) Oral three times a day PRN anxiety  magnesium hydroxide Suspension 30 milliLiter(s) Oral daily PRN Constipation  ondansetron Injectable 4 milliGRAM(s) IV Push every 6 hours PRN Nausea and/or Vomiting  oxyCODONE    IR 5 milliGRAM(s) Oral every 4 hours PRN Moderate Pain (4 - 6)  oxyCODONE    IR 10 milliGRAM(s) Oral every 4 hours PRN Severe Pain (7 - 10)  traMADol 50 milliGRAM(s) Oral every 6 hours PRN Mild Pain (1 - 3)      Allergies    penicillin (Unknown)    Intolerances        REVIEW OF SYSTEMS:  CONSTITUTIONAL: No fever or chills  HEENT:  No headache, no sore throat  RESPIRATORY: No cough, wheezing, or shortness of breath  CARDIOVASCULAR: No chest pain, palpitations  GASTROINTESTINAL: No abd pain, nausea, vomiting, or diarrhea  GENITOURINARY: No dysuria, frequency, or hematuria  NEUROLOGICAL: no new focal weakness, +LE weakness (right worse than left) gradually improving; no dizziness  MUSCULOSKELETAL: no myalgias; post-surgical neck pain (well-controlled)    Vital Signs Last 24 Hrs  T(C): 36.8 (07 Jun 2024 04:52), Max: 36.8 (07 Jun 2024 04:52)  T(F): 98.3 (07 Jun 2024 04:52), Max: 98.3 (07 Jun 2024 04:52)  HR: 63 (07 Jun 2024 04:52) (63 - 86)  BP: 112/62 (07 Jun 2024 04:52) (107/61 - 136/85)  BP(mean): --  RR: 18 (07 Jun 2024 04:52) (18 - 18)  SpO2: 91% (07 Jun 2024 04:52) (91% - 98%)    Parameters below as of 06 Jun 2024 21:00  Patient On (Oxygen Delivery Method): room air        PHYSICAL EXAM:  GENERAL: NAD at rest  HEENT:  anicteric, moist mucous membranes, hard cervical collar in place and drains in place  CHEST/LUNG:  CTA b/l, no rales, wheezes, or rhonchi appreciated  HEART:  RRR, S1, S2  ABDOMEN:  BS+, soft, nontender, nondistended  EXTREMITIES: no edema, cyanosis, or calf tenderness  NERVOUS SYSTEM: answers questions and follows commands appropriately; sensation to light touch intact; RLE 5/5 in hip/knee flexion, 2/5 strength in ankle, LLE 4+/5 strength    LABS:                 11.8   10.41 )-----------( 287      ( 06 Jun 2024 05:40 )             36.0                06-06    132<L>  |  98  |  41<H>  ----------------------------<  180<H>  4.7   |  29  |  1.10    Ca    8.2<L>      06 Jun 2024 05:40  Phos  4.0     06-06  Mg     2.3     06-06    TPro  5.5<L>  /  Alb  2.9<L>  /  TBili  0.5  /  DBili  x   /  AST  45<H>  /  ALT  147<H>  /  AlkPhos  45  06-06               Urinalysis Basic - ( 08 Jun 2024 04:50 )    Color: x / Appearance: x / SG: x / pH: x  Gluc: 113 mg/dL / Ketone: x  / Bili: x / Urobili: x   Blood: x / Protein: x / Nitrite: x   Leuk Esterase: x / RBC: x / WBC x   Sq Epi: x / Non Sq Epi: x / Bacteria: x      CAPILLARY BLOOD GLUCOSE              RADIOLOGY & ADDITIONAL TESTS:    Personally reviewed.     Consultant(s) Notes Reviewed:  [x] YES  [ ] NO     Patient is a 78y old  Male who presents with a chief complaint of b/l lower extremity weakness, inability to walk.       INTERVAL HPI/OVERNIGHT EVENTS: Pt states he still feels overall weakness in LEs (worse on right) with possibly gradual improvement. Neck pain is well-controlled. Pt denies fever, chills, SOB, CP, abd pain, n/v, dysuria, cough.    MEDICATIONS  (STANDING):  cyclobenzaprine 10 milliGRAM(s) Oral every 8 hours  DULoxetine 60 milliGRAM(s) Oral daily  gabapentin 300 milliGRAM(s) Oral every 12 hours  multivitamin 1 Tablet(s) Oral daily  mupirocin 2% Nasal 1 Application(s) Both Nostrils two times a day  polyethylene glycol 3350 17 Gram(s) Oral at bedtime  senna 2 Tablet(s) Oral at bedtime  vancomycin  IVPB 1000 milliGRAM(s) IV Intermittent every 12 hours    MEDICATIONS  (PRN):  clonazePAM  Tablet 0.5 milliGRAM(s) Oral three times a day PRN anxiety  magnesium hydroxide Suspension 30 milliLiter(s) Oral daily PRN Constipation  ondansetron Injectable 4 milliGRAM(s) IV Push every 6 hours PRN Nausea and/or Vomiting  oxyCODONE    IR 5 milliGRAM(s) Oral every 4 hours PRN Moderate Pain (4 - 6)  oxyCODONE    IR 10 milliGRAM(s) Oral every 4 hours PRN Severe Pain (7 - 10)  traMADol 50 milliGRAM(s) Oral every 6 hours PRN Mild Pain (1 - 3)      Allergies    penicillin (Unknown)    Intolerances        REVIEW OF SYSTEMS:  CONSTITUTIONAL: No fever or chills  HEENT:  No headache, no sore throat  RESPIRATORY: No cough, wheezing, or shortness of breath  CARDIOVASCULAR: No chest pain, palpitations  GASTROINTESTINAL: No abd pain, nausea, vomiting, or diarrhea  GENITOURINARY: No dysuria, frequency, or hematuria  NEUROLOGICAL: no new focal weakness, +LE weakness (right worse than left) gradually improving; no dizziness  MUSCULOSKELETAL: no myalgias; post-surgical neck pain (well-controlled)    Vital Signs Last 24 Hrs  T(C): 36.8 (07 Jun 2024 04:52), Max: 36.8 (07 Jun 2024 04:52)  T(F): 98.3 (07 Jun 2024 04:52), Max: 98.3 (07 Jun 2024 04:52)  HR: 63 (07 Jun 2024 04:52) (63 - 86)  BP: 112/62 (07 Jun 2024 04:52) (107/61 - 136/85)  BP(mean): --  RR: 18 (07 Jun 2024 04:52) (18 - 18)  SpO2: 91% (07 Jun 2024 04:52) (91% - 98%)    Parameters below as of 06 Jun 2024 21:00  Patient On (Oxygen Delivery Method): room air        PHYSICAL EXAM:  GENERAL: NAD at rest  HEENT:  anicteric, moist mucous membranes, hard cervical collar in place and drains in place  CHEST/LUNG:  CTA b/l, no rales, wheezes, or rhonchi appreciated  HEART:  RRR, S1, S2  ABDOMEN:  BS+, soft, nontender, nondistended  EXTREMITIES: no edema, cyanosis, or calf tenderness  NERVOUS SYSTEM: answers questions and follows commands appropriately; sensation to light touch intact; RLE 5/5 in hip/knee flexion, 2/5 strength in ankle, LLE 4+/5 strength    LABS:                 11.8   10.41 )-----------( 287      ( 06 Jun 2024 05:40 )             36.0                06-06    132<L>  |  98  |  41<H>  ----------------------------<  180<H>  4.7   |  29  |  1.10    Ca    8.2<L>      06 Jun 2024 05:40  Phos  4.0     06-06  Mg     2.3     06-06    TPro  5.5<L>  /  Alb  2.9<L>  /  TBili  0.5  /  DBili  x   /  AST  45<H>  /  ALT  147<H>  /  AlkPhos  45  06-06               Urinalysis Basic - ( 08 Jun 2024 04:50 )    Color: x / Appearance: x / SG: x / pH: x  Gluc: 113 mg/dL / Ketone: x  / Bili: x / Urobili: x   Blood: x / Protein: x / Nitrite: x   Leuk Esterase: x / RBC: x / WBC x   Sq Epi: x / Non Sq Epi: x / Bacteria: x      CAPILLARY BLOOD GLUCOSE              RADIOLOGY & ADDITIONAL TESTS:    Personally reviewed.     Consultant(s) Notes Reviewed:  [x] YES  [ ] NO     Patient is a 78y old  Male who presents with a chief complaint of b/l lower extremity weakness, inability to walk.       INTERVAL HPI/OVERNIGHT EVENTS: Pt states he still feels overall weakness in LEs (worse on right) with possibly gradual improvement. Neck pain is well-controlled. Pt denies fever, chills, SOB, CP, abd pain, n/v, dysuria, cough.    MEDICATIONS  (STANDING):  cyclobenzaprine 10 milliGRAM(s) Oral every 8 hours  DULoxetine 60 milliGRAM(s) Oral daily  gabapentin 300 milliGRAM(s) Oral every 12 hours  multivitamin 1 Tablet(s) Oral daily  mupirocin 2% Nasal 1 Application(s) Both Nostrils two times a day  polyethylene glycol 3350 17 Gram(s) Oral at bedtime  senna 2 Tablet(s) Oral at bedtime  vancomycin  IVPB 1000 milliGRAM(s) IV Intermittent every 12 hours    MEDICATIONS  (PRN):  clonazePAM  Tablet 0.5 milliGRAM(s) Oral three times a day PRN anxiety  magnesium hydroxide Suspension 30 milliLiter(s) Oral daily PRN Constipation  ondansetron Injectable 4 milliGRAM(s) IV Push every 6 hours PRN Nausea and/or Vomiting  oxyCODONE    IR 5 milliGRAM(s) Oral every 4 hours PRN Moderate Pain (4 - 6)  oxyCODONE    IR 10 milliGRAM(s) Oral every 4 hours PRN Severe Pain (7 - 10)  traMADol 50 milliGRAM(s) Oral every 6 hours PRN Mild Pain (1 - 3)      Allergies    penicillin (Unknown)    Intolerances        REVIEW OF SYSTEMS:  CONSTITUTIONAL: No fever or chills  HEENT:  No headache, no sore throat  RESPIRATORY: No cough, wheezing, or shortness of breath  CARDIOVASCULAR: No chest pain, palpitations  GASTROINTESTINAL: No abd pain, nausea, vomiting, or diarrhea  GENITOURINARY: No dysuria, frequency, or hematuria  NEUROLOGICAL: no new focal weakness, +LE weakness (right worse than left) gradually improving; no dizziness  MUSCULOSKELETAL: no myalgias; post-surgical neck pain (well-controlled)    Vital Signs Last 24 Hrs  T(C): 36.8 (07 Jun 2024 04:52), Max: 36.8 (07 Jun 2024 04:52)  T(F): 98.3 (07 Jun 2024 04:52), Max: 98.3 (07 Jun 2024 04:52)  HR: 63 (07 Jun 2024 04:52) (63 - 86)  BP: 112/62 (07 Jun 2024 04:52) (107/61 - 136/85)  BP(mean): --  RR: 18 (07 Jun 2024 04:52) (18 - 18)  SpO2: 91% (07 Jun 2024 04:52) (91% - 98%)    Parameters below as of 06 Jun 2024 21:00  Patient On (Oxygen Delivery Method): room air        PHYSICAL EXAM:  GENERAL: NAD at rest  HEENT:  anicteric, moist mucous membranes, hard cervical collar in place and drains in place  CHEST/LUNG:  CTA b/l, no rales, wheezes, or rhonchi appreciated  HEART:  RRR, S1, S2  ABDOMEN:  BS+, soft, nontender, nondistended  EXTREMITIES: no edema, cyanosis, or calf tenderness  NERVOUS SYSTEM: answers questions and follows commands appropriately; sensation to light touch intact; RLE 5/5 in hip/knee flexion, 2/5 strength in ankle, LLE 4+/5 strength    LABS:      LABS:                                   11.6   16.22 )-----------( 289      ( 07 Jun 2024 07:45 )             34.9                07 Jun 2024 07:45    132    |  97     |  53     ----------------------------<  123    4.2     |  30     |  1.10     Ca    7.7        07 Jun 2024 07:45      CAPILLARY BLOOD GLUCOSE          RADIOLOGY & ADDITIONAL TESTS:    Personally reviewed.     Consultant(s) Notes Reviewed:  [x] YES  [ ] NO

## 2024-06-07 NOTE — PROGRESS NOTE ADULT - SUBJECTIVE AND OBJECTIVE BOX
Chief Complaint: Leg pain    Interval Events: No events overnight.    Review of Systems:  General: No fevers, chills, weight gain  Skin: No rashes, color changes  Cardiovascular: No chest pain, orthopnea  Respiratory: No shortness of breath, cough  Gastrointestinal: No nausea, abdominal pain  Genitourinary: No incontinence, pain with urination  Musculoskeletal: +pain, swelling, decreased range of motion  Neurological: No headache, weakness  Psychiatric: No depression, anxiety  Endocrine: No weight gain, increased thirst  All other systems are comprehensively negative.    Physical Exam:  Vital Signs Last 24 Hrs  T(C): 36.8 (07 Jun 2024 04:52), Max: 36.8 (07 Jun 2024 04:52)  T(F): 98.3 (07 Jun 2024 04:52), Max: 98.3 (07 Jun 2024 04:52)  HR: 63 (07 Jun 2024 04:52) (63 - 86)  BP: 112/62 (07 Jun 2024 04:52) (107/61 - 136/85)  BP(mean): --  RR: 18 (07 Jun 2024 04:52) (18 - 18)  SpO2: 91% (07 Jun 2024 04:52) (91% - 98%)  Parameters below as of 06 Jun 2024 21:00  Patient On (Oxygen Delivery Method): room air  General: NAD  HEENT: MMM  Neck: No JVD, no carotid bruit  Lungs: CTAB  CV: RRR, nl S1/S2, no M/R/G  Abdomen: S/NT/ND, +BS  Extremities: No LE edema, no cyanosis  Neuro: AAOx3, non-focal  Skin: No rash    Labs:    06-06    132<L>  |  98  |  41<H>  ----------------------------<  180<H>  4.7   |  29  |  1.10    Ca    8.2<L>      06 Jun 2024 05:40  Phos  4.0     06-06  Mg     2.3     06-06    TPro  5.5<L>  /  Alb  2.9<L>  /  TBili  0.5  /  DBili  x   /  AST  45<H>  /  ALT  147<H>  /  AlkPhos  45  06-06                        11.8   10.41 )-----------( 287      ( 06 Jun 2024 05:40 )             36.0       Telemetry: Sinus rhythm

## 2024-06-07 NOTE — PROGRESS NOTE ADULT - ASSESSMENT
79yo M w/ PMH of spinal stenosis presents to ED with bilateral lower extremity weakness and inability to ambulate, admitted with bilateral lower extremity weakness found to have cord compression in C-spine and spinal stenosis in lumbar spine now s/p C1-C2 PCDF and Bilateral cervical paraspinal muscle flap and complex closure by plastics on 6/4.     #bilateral lower extremity weakness   - MRI reviewed with severe stenosis in lumbar spine and cord compression in cervical spine  - ortho spine (Dr. Maurice barnett), recs appreciated  - pt treated with decadron and prepped for OR  - pt s/p C1-C2 PCDF and Bilateral cervical paraspinal muscle flap and complex closure by plastics on 6/4  - c/w gabapentin/flexeril  - opiates PRN -- pain is well-controlled per pt  - c/w bowel regimen  - PT/OT quyen noted, rec [facility], pt initially resistant to the idea, but today agreed to pursue [facility] -- SW working with pt and his wife on placement  - hard C-Collar per ortho (likely until OP follow up)  - Pending removal of bilateral SERGEI drains (monitor output)  - c/w prophylaxis with vanco while hospitalized and drains are in. Last vanc trough 12 which is appropriate, will recheck in AM    #Anemia  - mild acute blood loss anemia due to spine surgery  - Hgb now stable in 11.5-12 range  - monitor CBC    #Leukocytosis  - suspect reactive due to recent decadron and surgery (suspect BUN elevation also due to high dose steroids pt was on)  - afebrile and no symptoms of infection  - monitor closely  - will check CBC diff in AM    #Anxiety   - c/w duloxetine   - not using klonopin at home (ordered PRN)    #VTE proph: SCDs (defer chemical per ortho spine for now given spine surgery)    Dispo: d/c to [facility], possibly over the weekend     79yo M w/ PMH of spinal stenosis presents to ED with bilateral lower extremity weakness and inability to ambulate, admitted with bilateral lower extremity weakness found to have cord compression in C-spine and spinal stenosis in lumbar spine now s/p C1-C2 PCDF and Bilateral cervical paraspinal muscle flap and complex closure by plastics on 6/4.     #bilateral lower extremity weakness (right more weak than left)  - MRI reviewed with severe stenosis in lumbar spine and cord compression in cervical spine  - ortho spine (Dr. [facility]), recs appreciated  - pt treated with decadron and prepped for OR  - pt s/p C1-C2 PCDF and Bilateral cervical paraspinal muscle flap and complex closure by plastics on 6/4  - strength gradually improving post-op  - c/w gabapentin/flexeril  - opiates PRN -- pain is well-controlled per pt  - c/w bowel regimen  - PT/OT eval noted, rec [facility], pt initially resistant to the idea, but today agreed to pursue [facility] -- SW working with pt and his wife on placement  - hard C-Collar per ortho (likely until OP follow up)  - Pending removal of bilateral SERGEI drains (monitor output)  - c/w prophylaxis with vanco while hospitalized and drains are in. Last vanc trough 12 which is appropriate, will recheck in AM    #Anemia  - mild acute blood loss anemia due to spine surgery  - Hgb now stable in 11.5-12 range  - monitor CBC    #Leukocytosis  - suspect reactive due to recent decadron and surgery (suspect BUN elevation also due to high dose steroids pt was on)  - afebrile and no symptoms of infection  - monitor closely  - will check CBC diff in AM    #Anxiety   - c/w duloxetine   - not using klonopin at home (ordered PRN)    #VTE proph: SCDs (defer chemical per ortho spine for now given spine surgery)    Dispo: d/c to [facility], possibly over the weekend

## 2024-06-07 NOTE — PROGRESS NOTE ADULT - TIME BILLING
Pt seen + examined on 6/7. Note written by attending, see above.  Time spent: 51min. Time spent discussing/coordinating care with consultants, CM/SW team, and counseling the patient and pt's wife on medical condition -- cervical cord compression s/p C1-C2 PCDF and Bilateral cervical paraspinal muscle flap and complex closure, muscle strength and how it was affected by spinal disease, pain control, [facility], eventual drain removal, prophylactic abx while drains are in place.
Review of laboratory data, radiology results, consultants' recommendations, documentation in [place], discussion with patient/house staff and interdisciplinary staff (such as , social workers, etc). Interventions were performed as documented above.

## 2024-06-07 NOTE — CASE MANAGEMENT PROGRESS NOTE - NSCMPROGRESSNOTE_GEN_ALL_CORE
Patient discussed in rounds and remains acute on Vanco.  SERGEI in place. Physical therapy recommended rehab.  Patient will like to go home with HPT but, wife will like patient to go to rehab.  Discharge disposition unclear at this time.  CM remains available throughout hospital stay.

## 2024-06-07 NOTE — PROGRESS NOTE ADULT - SUBJECTIVE AND OBJECTIVE BOX
SUBJECTIVE:       Pt seen and examined at bedside. No acute events overnight. Patient feeling well. Reports pain well-controlled with medication. No CP, SOB, N/V, F/C, new N/T.    Vital Signs (24 Hrs):  T(C): 36.8 (06-07-24 @ 04:52), Max: 36.8 (06-07-24 @ 04:52)  HR: 63 (06-07-24 @ 04:52) (63 - 86)  BP: 112/62 (06-07-24 @ 04:52) (107/61 - 136/85)  RR: 18 (06-07-24 @ 04:52) (18 - 18)  SpO2: 91% (06-07-24 @ 04:52) (91% - 98%)  Wt(kg): --    LABS:                          11.8   10.41 )-----------( 287      ( 06 Jun 2024 05:40 )             36.0     06-06    132<L>  |  98  |  41<H>  ----------------------------<  180<H>  4.7   |  29  |  1.10    Ca    8.2<L>      06 Jun 2024 05:40  Phos  4.0     06-06  Mg     2.3     06-06    TPro  5.5<L>  /  Alb  2.9<L>  /  TBili  0.5  /  DBili  x   /  AST  45<H>  /  ALT  147<H>  /  AlkPhos  45  06-06    LIVER FUNCTIONS - ( 06 Jun 2024 05:40 )  Alb: 2.9 g/dL / Pro: 5.5 g/dL / ALK PHOS: 45 U/L / ALT: 147 U/L / AST: 45 U/L / GGT: x               Physical Exam:   General: NAD, patient laying in bed comfortably  Calves nontender  Compartments compressible, soft    Spine:  Dressings C/D/I, hard collar in place  2 SERGEI drains in place    MOTOR EXAM:                          Elbow Flex (C5)     Wrist Ext (C6)     Elbow Ext (C7)      Finger Flex (C8)    Finger Abduction (T1)  RIGHT                 5/5                         5/5                         5/5                          5/5                              5/5  LEFT                    5/5                         5/5                         5/5                          5/5                              5/5                         Hip Flex (L2)      Knee Ext (L3)      Ank Dorsiflex (L4)     Hallux Ext (L5)     Ank PlantarFlex (S1)  RIGHT               5/5                      5/5                         2/5                            1/5                           5/5  LEFT                  5/5                      4+/5                          4/5                            5/5                           5/5  *BL Foot Drop - R DF To about 10-15 degrees below neutral, 2/5. L DF to about neutral, 4/5 at neutral    SENSORY EXAM:                        C5      C6      C7      C8       T1          RIGHT          2         2        2         2         2          (0=absent, 1=impaired, 2=normal, NT=not testable)  LEFT             2         2        2         2         2          (0=absent, 1=impaired, 2=normal, NT=not testable)                      L2        L3       L4      L5       S1          RIGHT        2          2         2        2        2           (0=absent, 1=impaired, 2=normal, NT=not testable)  LEFT           2          2         2        2        2           (0=absent, 1=impaired, 2=normal, NT=not testable)    No pain with SLR, no clonus, no babinksi, no colin    A/P: 78M POD3 C1-C2 PCDF     WBAT, hard C-collar on at all times  PT/OT  Analgesics  Hold DVT chemical ppx, SCDs ok  Please record drain outputs  Remove quiroz when ambulating  Incentive spirometry  Dispo: [facility] per PT  Appreciate medical recs  Will discuss plan with Dr. Richards and notify primary team of any changes to plan

## 2024-06-07 NOTE — CASE MANAGEMENT PROGRESS NOTE - NSCMPROGRESSNOTE_GEN_ALL_CORE
CM noted consult for stairs.  Patient has 3 steps to get in and 13 to the second floor.  Physical therapy recommended patient to go to rehab. Wife is in agreement. CM remains available throughout hospital stay.

## 2024-06-07 NOTE — DISCHARGE NOTE NURSING/CASE MANAGEMENT/SOCIAL WORK - PATIENT PORTAL LINK FT
You can access the FollowMyHealth Patient Portal offered by [facility] by registering at the following website: [email or web address]. By joining [employer]’s FollowMyHealth portal, you will also be able to view your health information using other applications (apps) compatible with our system.

## 2024-06-08 VITALS
HEART RATE: 87 BPM | DIASTOLIC BLOOD PRESSURE: 61 MMHG | SYSTOLIC BLOOD PRESSURE: 98 MMHG | OXYGEN SATURATION: 98 % | TEMPERATURE: 98 F | RESPIRATION RATE: 18 BRPM

## 2024-06-08 LAB
ALBUMIN SERPL ELPH-MCNC: 2.8 G/DL — LOW (ref 3.3–5)
ALP SERPL-CCNC: 49 U/L — SIGNIFICANT CHANGE UP (ref 40–120)
ALT FLD-CCNC: 104 U/L — HIGH (ref 12–78)
ANION GAP SERPL CALC-SCNC: 6 MMOL/L — SIGNIFICANT CHANGE UP (ref 5–17)
AST SERPL-CCNC: 29 U/L — SIGNIFICANT CHANGE UP (ref 15–37)
BASOPHILS # BLD AUTO: 0.01 K/UL — SIGNIFICANT CHANGE UP (ref 0–0.2)
BASOPHILS NFR BLD AUTO: 0.1 % — SIGNIFICANT CHANGE UP (ref 0–2)
BILIRUB SERPL-MCNC: 0.4 MG/DL — SIGNIFICANT CHANGE UP (ref 0.2–1.2)
BUN SERPL-MCNC: 44 MG/DL — HIGH (ref 7–23)
CALCIUM SERPL-MCNC: 8.3 MG/DL — LOW (ref 8.5–10.1)
CHLORIDE SERPL-SCNC: 100 MMOL/L — SIGNIFICANT CHANGE UP (ref 96–108)
CO2 SERPL-SCNC: 27 MMOL/L — SIGNIFICANT CHANGE UP (ref 22–31)
CREAT SERPL-MCNC: 1.1 MG/DL — SIGNIFICANT CHANGE UP (ref 0.5–1.3)
EGFR: 68 ML/MIN/1.73M2 — SIGNIFICANT CHANGE UP
EOSINOPHIL # BLD AUTO: 0.23 K/UL — SIGNIFICANT CHANGE UP (ref 0–0.5)
EOSINOPHIL NFR BLD AUTO: 1.6 % — SIGNIFICANT CHANGE UP (ref 0–6)
GLUCOSE SERPL-MCNC: 113 MG/DL — HIGH (ref 70–99)
HCT VFR BLD CALC: 36.9 % — LOW (ref 39–50)
HGB BLD-MCNC: 11.9 G/DL — LOW (ref 13–17)
IMM GRANULOCYTES NFR BLD AUTO: 0.5 % — SIGNIFICANT CHANGE UP (ref 0–0.9)
LYMPHOCYTES # BLD AUTO: 1.71 K/UL — SIGNIFICANT CHANGE UP (ref 1–3.3)
LYMPHOCYTES # BLD AUTO: 11.9 % — LOW (ref 13–44)
MCHC RBC-ENTMCNC: 28.5 PG — SIGNIFICANT CHANGE UP (ref 27–34)
MCHC RBC-ENTMCNC: 32.2 GM/DL — SIGNIFICANT CHANGE UP (ref 32–36)
MCV RBC AUTO: 88.3 FL — SIGNIFICANT CHANGE UP (ref 80–100)
MONOCYTES # BLD AUTO: 1.14 K/UL — HIGH (ref 0–0.9)
MONOCYTES NFR BLD AUTO: 7.9 % — SIGNIFICANT CHANGE UP (ref 2–14)
NEUTROPHILS # BLD AUTO: 11.19 K/UL — HIGH (ref 1.8–7.4)
NEUTROPHILS NFR BLD AUTO: 78 % — HIGH (ref 43–77)
NRBC # BLD: 0 /100 WBCS — SIGNIFICANT CHANGE UP (ref 0–0)
PLATELET # BLD AUTO: 160 K/UL — SIGNIFICANT CHANGE UP (ref 150–400)
POTASSIUM SERPL-MCNC: 4.3 MMOL/L — SIGNIFICANT CHANGE UP (ref 3.5–5.3)
POTASSIUM SERPL-SCNC: 4.3 MMOL/L — SIGNIFICANT CHANGE UP (ref 3.5–5.3)
PROT SERPL-MCNC: 5.5 G/DL — LOW (ref 6–8.3)
RBC # BLD: 4.18 M/UL — LOW (ref 4.2–5.8)
RBC # FLD: 13.2 % — SIGNIFICANT CHANGE UP (ref 10.3–14.5)
SODIUM SERPL-SCNC: 133 MMOL/L — LOW (ref 135–145)
VANCOMYCIN TROUGH SERPL-MCNC: 18.4 UG/ML — SIGNIFICANT CHANGE UP (ref 10–20)
WBC # BLD: 14.35 K/UL — HIGH (ref 3.8–10.5)
WBC # FLD AUTO: 14.35 K/UL — HIGH (ref 3.8–10.5)

## 2024-06-08 PROCEDURE — 84100 ASSAY OF PHOSPHORUS: CPT

## 2024-06-08 PROCEDURE — 70498 CT ANGIOGRAPHY NECK: CPT | Mod: MC

## 2024-06-08 PROCEDURE — 97530 THERAPEUTIC ACTIVITIES: CPT

## 2024-06-08 PROCEDURE — 76000 FLUOROSCOPY <1 HR PHYS/QHP: CPT

## 2024-06-08 PROCEDURE — 87641 MR-STAPH DNA AMP PROBE: CPT

## 2024-06-08 PROCEDURE — 80048 BASIC METABOLIC PNL TOTAL CA: CPT

## 2024-06-08 PROCEDURE — 83036 HEMOGLOBIN GLYCOSYLATED A1C: CPT

## 2024-06-08 PROCEDURE — 97165 OT EVAL LOW COMPLEX 30 MIN: CPT

## 2024-06-08 PROCEDURE — 72141 MRI NECK SPINE W/O DYE: CPT | Mod: MC

## 2024-06-08 PROCEDURE — 70450 CT HEAD/BRAIN W/O DYE: CPT | Mod: MC

## 2024-06-08 PROCEDURE — 85025 COMPLETE CBC W/AUTO DIFF WBC: CPT

## 2024-06-08 PROCEDURE — 36415 COLL VENOUS BLD VENIPUNCTURE: CPT

## 2024-06-08 PROCEDURE — 83735 ASSAY OF MAGNESIUM: CPT

## 2024-06-08 PROCEDURE — 86900 BLOOD TYPING SEROLOGIC ABO: CPT

## 2024-06-08 PROCEDURE — 97116 GAIT TRAINING THERAPY: CPT

## 2024-06-08 PROCEDURE — 71045 X-RAY EXAM CHEST 1 VIEW: CPT

## 2024-06-08 PROCEDURE — 97535 SELF CARE MNGMENT TRAINING: CPT

## 2024-06-08 PROCEDURE — 85027 COMPLETE CBC AUTOMATED: CPT

## 2024-06-08 PROCEDURE — 86901 BLOOD TYPING SEROLOGIC RH(D): CPT

## 2024-06-08 PROCEDURE — 85730 THROMBOPLASTIN TIME PARTIAL: CPT

## 2024-06-08 PROCEDURE — 94640 AIRWAY INHALATION TREATMENT: CPT

## 2024-06-08 PROCEDURE — 99239 HOSP IP/OBS DSCHRG MGMT >30: CPT

## 2024-06-08 PROCEDURE — 82962 GLUCOSE BLOOD TEST: CPT

## 2024-06-08 PROCEDURE — C1713: CPT

## 2024-06-08 PROCEDURE — 72146 MRI CHEST SPINE W/O DYE: CPT | Mod: MC

## 2024-06-08 PROCEDURE — 72125 CT NECK SPINE W/O DYE: CPT | Mod: MC

## 2024-06-08 PROCEDURE — C1889: CPT

## 2024-06-08 PROCEDURE — 87640 STAPH A DNA AMP PROBE: CPT

## 2024-06-08 PROCEDURE — 80202 ASSAY OF VANCOMYCIN: CPT

## 2024-06-08 PROCEDURE — 93005 ELECTROCARDIOGRAM TRACING: CPT

## 2024-06-08 PROCEDURE — 80053 COMPREHEN METABOLIC PANEL: CPT

## 2024-06-08 PROCEDURE — 86850 RBC ANTIBODY SCREEN: CPT

## 2024-06-08 PROCEDURE — 97168 OT RE-EVAL EST PLAN CARE: CPT

## 2024-06-08 PROCEDURE — 72148 MRI LUMBAR SPINE W/O DYE: CPT | Mod: MC

## 2024-06-08 PROCEDURE — 97162 PT EVAL MOD COMPLEX 30 MIN: CPT

## 2024-06-08 PROCEDURE — 85610 PROTHROMBIN TIME: CPT

## 2024-06-08 PROCEDURE — 70496 CT ANGIOGRAPHY HEAD: CPT | Mod: MC

## 2024-06-08 PROCEDURE — 99285 EMERGENCY DEPT VISIT HI MDM: CPT

## 2024-06-08 RX ORDER — OXYCODONE HYDROCHLORIDE 5 MG/1
1 TABLET ORAL
Qty: 0 | Refills: 0 | DISCHARGE
Start: 2024-06-08

## 2024-06-08 RX ORDER — CEPHALEXIN 500 MG
1 CAPSULE ORAL
Qty: 0 | Refills: 0 | DISCHARGE

## 2024-06-08 RX ORDER — TRAMADOL HYDROCHLORIDE 50 MG/1
1 TABLET ORAL
Qty: 0 | Refills: 0 | DISCHARGE
Start: 2024-06-08

## 2024-06-08 RX ORDER — DICLOFENAC SODIUM 75 MG/1
1 TABLET, DELAYED RELEASE ORAL
Refills: 0 | DISCHARGE

## 2024-06-08 RX ORDER — SACCHAROMYCES BOULARDII 250 MG
1 POWDER IN PACKET (EA) ORAL
Qty: 0 | Refills: 0 | DISCHARGE

## 2024-06-08 RX ORDER — CYCLOBENZAPRINE HYDROCHLORIDE 10 MG/1
1 TABLET, FILM COATED ORAL
Qty: 0 | Refills: 0 | DISCHARGE
Start: 2024-06-08

## 2024-06-08 RX ORDER — MAGNESIUM HYDROXIDE 400 MG/1
30 TABLET, CHEWABLE ORAL
Qty: 0 | Refills: 0 | DISCHARGE
Start: 2024-06-08

## 2024-06-08 RX ORDER — MUPIROCIN 20 MG/G
1 OINTMENT TOPICAL
Qty: 0 | Refills: 0 | DISCHARGE

## 2024-06-08 RX ORDER — GABAPENTIN 400 MG/1
1 CAPSULE ORAL
Qty: 0 | Refills: 0 | DISCHARGE
Start: 2024-06-08

## 2024-06-08 RX ORDER — NALOXONE HYDROCHLORIDE 4 MG/.1ML
8 SPRAY NASAL
Refills: 0 | Status: DISCONTINUED | OUTPATIENT
Start: 2024-06-08 | End: 2024-06-08

## 2024-06-08 RX ORDER — POLYETHYLENE GLYCOL 3350 17 G/17G
17 POWDER, FOR SOLUTION ORAL
Qty: 0 | Refills: 0 | DISCHARGE
Start: 2024-06-08

## 2024-06-08 RX ORDER — NALOXONE HYDROCHLORIDE 4 MG/.1ML
1 SPRAY NASAL
Qty: 0 | Refills: 0 | DISCHARGE

## 2024-06-08 RX ORDER — SENNA PLUS 8.6 MG/1
2 TABLET ORAL
Qty: 0 | Refills: 0 | DISCHARGE
Start: 2024-06-08

## 2024-06-08 RX ORDER — CEPHALEXIN 500 MG
500 CAPSULE ORAL
Refills: 0 | Status: DISCONTINUED | OUTPATIENT
Start: 2024-06-08 | End: 2024-06-08

## 2024-06-08 RX ADMIN — Medication 1 TABLET(S): at 11:48

## 2024-06-08 RX ADMIN — Medication 500 MILLIGRAM(S): at 13:32

## 2024-06-08 RX ADMIN — CYCLOBENZAPRINE HYDROCHLORIDE 10 MILLIGRAM(S): 10 TABLET, FILM COATED ORAL at 13:32

## 2024-06-08 RX ADMIN — GABAPENTIN 300 MILLIGRAM(S): 400 CAPSULE ORAL at 05:18

## 2024-06-08 RX ADMIN — CYCLOBENZAPRINE HYDROCHLORIDE 10 MILLIGRAM(S): 10 TABLET, FILM COATED ORAL at 05:18

## 2024-06-08 RX ADMIN — Medication 250 MILLIGRAM(S): at 05:18

## 2024-06-08 RX ADMIN — MUPIROCIN 1 APPLICATION(S): 20 OINTMENT TOPICAL at 05:18

## 2024-06-08 RX ADMIN — DULOXETINE HYDROCHLORIDE 60 MILLIGRAM(S): 30 CAPSULE, DELAYED RELEASE ORAL at 11:49

## 2024-06-08 NOTE — PROGRESS NOTE ADULT - PROVIDER SPECIALTY LIST ADULT
Cardiology
Hospitalist
Internal Medicine
Orthopedics
Cardiology
Hospitalist
Orthopedics
Critical Care
Hospitalist
Orthopedics
Cardiology
Hospitalist

## 2024-06-08 NOTE — PROGRESS NOTE ADULT - REASON FOR ADMISSION
lower extremity weakness, inability to walk

## 2024-06-08 NOTE — PROGRESS NOTE ADULT - SUBJECTIVE AND OBJECTIVE BOX
SUBJECTIVE:       Pt seen and examined at bedside. No acute events overnight. Patient doing well with no complaints overall. Reports pain well-controlled with medication. No CP, SOB, N/V, F/C, new N/T.    Vital Signs (24 Hrs):  T(C): 36.4 (06-08-24 @ 05:07), Max: 36.4 (06-07-24 @ 11:00)  HR: 71 (06-08-24 @ 05:07) (71 - 82)  BP: 101/63 (06-08-24 @ 05:07) (97/62 - 101/63)  RR: 18 (06-08-24 @ 05:07) (18 - 18)  SpO2: 95% (06-08-24 @ 05:07) (95% - 99%)  Wt(kg): --    LABS:                          11.9   14.35 )-----------( 160      ( 08 Jun 2024 04:50 )             36.9     06-08    133<L>  |  100  |  44<H>  ----------------------------<  113<H>  4.3   |  27  |  1.10    Ca    8.3<L>      08 Jun 2024 04:50    TPro  5.5<L>  /  Alb  2.8<L>  /  TBili  0.4  /  DBili  x   /  AST  29  /  ALT  104<H>  /  AlkPhos  49  06-08    LIVER FUNCTIONS - ( 08 Jun 2024 04:50 )  Alb: 2.8 g/dL / Pro: 5.5 g/dL / ALK PHOS: 49 U/L / ALT: 104 U/L / AST: 29 U/L / GGT: x               Physical Exam:   General: NAD, patient laying in bed comfortably  Calves nontender  Compartments compressible, soft    Spine:  Dressings C/D/I, hard collar in place  2 SERGEI drains in place    MOTOR EXAM:                          Elbow Flex (C5)     Wrist Ext (C6)     Elbow Ext (C7)      Finger Flex (C8)    Finger Abduction (T1)  RIGHT                 5/5                         5/5                         5/5                          5/5                              5/5  LEFT                    5/5                         5/5                         5/5                          5/5                              5/5                         Hip Flex (L2)      Knee Ext (L3)      Ank Dorsiflex (L4)     Hallux Ext (L5)     Ank PlantarFlex (S1)  RIGHT               5/5                      5/5                         2/5                            1/5                           5/5  LEFT                  5/5                      5/5                        5/5                            5/5                           5/5  *BL Foot Drop - R DF To about 10-15 degrees below neutral, 2/5. L DF to about neutral, 4/5 at neutral    SENSORY EXAM:                        C5      C6      C7      C8       T1          RIGHT          2         2        2         2         2          (0=absent, 1=impaired, 2=normal, NT=not testable)  LEFT             2         2        2         2         2          (0=absent, 1=impaired, 2=normal, NT=not testable)                      L2        L3       L4      L5       S1          RIGHT        2          2         2        2        2           (0=absent, 1=impaired, 2=normal, NT=not testable)  LEFT           2          2         2        2        2           (0=absent, 1=impaired, 2=normal, NT=not testable)    No pain with SLR, no clonus, no babinksi, no colin    A/P: 78M POD4 C1-C2 PCDF     WBAT, hard C-collar on at all times  PT/OT  Analgesics  Hold DVT chemical ppx, SCDs ok  Please record drain outputs - ok for drains to be removed at [facility]  Remove quiroz when ambulating  Incentive spirometry  Dispo: [facility] per PT  Appreciate medical recs  F/u with Dr. Richards in 7-10 days  Ortho stable for DC  Will discuss plan with Dr. Richards and notify primary team of any changes to plan

## 2024-06-08 NOTE — PROGRESS NOTE ADULT - ASSESSMENT
The patient is a 78 year old male with a history of anxiety, GERD, spinal stenosis who presents with worsening bilateral leg pain, heaviness.     6/8/24  Seen at [facility]  Lying flat, sleeping comfortably    Plan:  - ECG with no evidence of ischemia or infarction  - Ortho follow-up  - PT  - Discharge planning

## 2024-06-08 NOTE — SOCIAL WORK PROGRESS NOTE - NSSWPROGRESSNOTE_GEN_ALL_CORE
MD cleared pt for dc to [facility]. [facility]en confirmed pt can admit today. P/U via ambulette at 2pm. Pt and pt spouse aware and in agreement.   SW to remain available as needed.

## 2024-06-08 NOTE — PROGRESS NOTE ADULT - SUBJECTIVE AND OBJECTIVE BOX
Patient is a 78y Male with a known history of :  Lower extremity weakness [R29.898]    Need for prophylactic measure [Z29.9]    Anxiety [F41.9]      HPI:  79yo M PMH spinal stenosis, L clavical fx in 12/2023, anxiety presenting to the ED sent in by Dr. Richards for worsening bilateral lower extremity pain. Pt has multi-level stenosis and has been experiencing mild weakness in his legs for the past few years, which has gotten acutely worse within the last week. Pt endorses immediate leg heaviness and fatigue on ambulation, with frequent cramps. Has been going to PT that  has been managing symptoms up until recently. Denies any trauma to the area. Denies back pain, chest pain, fevers, chills, tingling or numbness in the lower extremities, confusion, or vision changes.   Patient denies any past medical history of DM2/CHF/TIA/CVA/CAD/MI.   Denies any family history of adverse reactions to anesthesia.       In the ED  Vitals: T 97.7F, HR 94, /79, RR 18, Sat 99% on RA  Labs: CBC within normal, BUN elevated 27, Cr 1.3 Glucose 102, eGFR low 56  Imaging:   - MRI CERVICAL SPINE: Multilevel degenerative changes. C2-C3 ken[place] spinal cord compression without abnormal intrinsic cord signal. Remaining levels demonstrate predominantly mild spinal canal stenosis. Multilevel neural foraminal narrowing: C2-C3 mild to moderate left neural foraminal narrowing, C3-C4 marked bilateral neural foraminal narrowing, C4-C5 moderate left neural foraminal narrowing, C5-C6 moderate to marked right and mild to moderate left neural foraminal narrowing, C6-C7 moderate to marked right and moderate left neural foraminal narrowing.  - MRI THORACIC SPINE: Multilevel degenerative disc disease resulting in mild multilevel spinal  canal stenosis. Moderate left neural foraminal narrowing at T9 and T11-T12.  - MRI LUMBAR SPINE: Multilevel degenerative changes. L2-L3 marked thecal sac compression, L3-L4 moderate thecal sac compression with marked right and moderate left lateral recess stenosis, L4-L5 marked thecal sac compression, L5-S1 mass effect upon the bilateral descending S1 nerve roots. L3-L4 moderate right neural foraminal narrowing, L4-L5 moderate right neural foraminal narrowing, L5-S1 moderate right and moderate to severe left neural foraminal narrowing.  CXR official read pending  EKG NSR, VR 83, QT/QTc 378/444  Received 1L NS bolus x1 (31 May 2024 13:39)      REVIEW OF SYSTEMS:    CONSTITUTIONAL: No fever, weight loss, or fatigue  EYES: No eye pain, visual disturbances, or discharge  ENMT:  No difficulty hearing, tinnitus, vertigo; No sinus or throat pain  NECK: No pain or stiffness  BREASTS: No pain, masses, or nipple discharge  RESPIRATORY: No cough, wheezing, chills or hemoptysis; No shortness of breath  CARDIOVASCULAR: No chest pain, palpitations, dizziness, or leg swelling  GASTROINTESTINAL: No abdominal or epigastric pain. No nausea, vomiting, or hematemesis; No diarrhea or constipation. No melena or hematochezia.  GENITOURINARY: No dysuria, frequency, hematuria, or incontinence  NEUROLOGICAL: No headaches, memory loss, loss of strength, numbness, or tremors  SKIN: No itching, burning, rashes, or lesions   LYMPH NODES: No enlarged glands  ENDOCRINE: No heat or cold intolerance; No hair loss  MUSCULOSKELETAL: No joint pain or swelling; No muscle, back, or extremity pain  PSYCHIATRIC: No depression, anxiety, mood swings, or difficulty sleeping  HEME/LYMPH: No easy bruising, or bleeding gums  ALLERGY AND IMMUNOLOGIC: No hives or eczema    MEDICATIONS  (STANDING):  cyclobenzaprine 10 milliGRAM(s) Oral every 8 hours  DULoxetine 60 milliGRAM(s) Oral daily  gabapentin 300 milliGRAM(s) Oral every 12 hours  multivitamin 1 Tablet(s) Oral daily  mupirocin 2% Nasal 1 Application(s) Both Nostrils two times a day  polyethylene glycol 3350 17 Gram(s) Oral at bedtime  senna 2 Tablet(s) Oral at bedtime  vancomycin  IVPB 1000 milliGRAM(s) IV Intermittent every 12 hours    MEDICATIONS  (PRN):  clonazePAM  Tablet 0.5 milliGRAM(s) Oral three times a day PRN anxiety  magnesium hydroxide Suspension 30 milliLiter(s) Oral daily PRN Constipation  ondansetron Injectable 4 milliGRAM(s) IV Push every 6 hours PRN Nausea and/or Vomiting  oxyCODONE    IR 5 milliGRAM(s) Oral every 4 hours PRN Moderate Pain (4 - 6)  oxyCODONE    IR 10 milliGRAM(s) Oral every 4 hours PRN Severe Pain (7 - 10)  traMADol 50 milliGRAM(s) Oral every 6 hours PRN Mild Pain (1 - 3)      ALLERGIES: penicillin (Unknown)      FAMILY HISTORY:  FH: type 2 diabetes (Mother)        Social history:  Alochol:   Smoking:   Drug Use:   Marital Status:     PHYSICAL EXAMINATION:  -----------------------------  T(C): 36.4 (06-08-24 @ 05:07), Max: 36.4 (06-07-24 @ 21:31)  HR: 71 (06-08-24 @ 05:07) (71 - 82)  BP: 101/63 (06-08-24 @ 05:07) (99/62 - 101/63)  RR: 18 (06-08-24 @ 05:07) (18 - 18)  SpO2: 95% (06-08-24 @ 05:07) (95% - 95%)  Wt(kg): --    06-07 @ 07:01  -  06-08 @ 07:00  --------------------------------------------------------  IN:  Total IN: 0 mL    OUT:    Bulb (mL): 60 mL    Bulb (mL): 10 mL    Voided (mL): 1950 mL  Total OUT: 2020 mL    Total NET: -2020 mL            Constitutional: well developed, normal appearance, well groomed, well nourished, no deformities and no acute distress.   Eyes: the conjunctiva exhibited no abnormalities and the eyelids demonstrated no xanthelasmas.   HEENT: normal oral mucosa, no oral pallor and no oral cyanosis.   Neck: normal jugular venous A waves present, normal jugular venous V waves present and no jugular venous brennan A waves.   Pulmonary: no respiratory distress, normal respiratory rhythm and effort, no accessory muscle use and lungs were clear to auscultation bilaterally. Anteriorly  Cardiovascular: heart rate and rhythm were normal, normal S1 and S2 and no murmur, gallop, rub, heave or thrill are present. .   Musculoskeletal: the gait could not be assessed.   Extremities: no clubbing of the fingernails, no localized cyanosis, no petechial hemorrhages and no ischemic changes.   Skin: normal skin color and pigmentation, no rash, no venous stasis, no skin lesions, no skin ulcer and no xanthoma was observed.       LABS:   --------  06-08    133<L>  |  100  |  44<H>  ----------------------------<  113<H>  4.3   |  27  |  1.10    Ca    8.3<L>      08 Jun 2024 04:50    TPro  5.5<L>  /  Alb  2.8<L>  /  TBili  0.4  /  DBili  x   /  AST  29  /  ALT  104<H>  /  AlkPhos  49  06-08                         11.9   14.35 )-----------( 160      ( 08 Jun 2024 04:50 )             36.9                   Radiology:

## 2024-06-19 ENCOUNTER — EMERGENCY (EMERGENCY)
Facility: HOSPITAL | Age: 79
LOS: 1 days | Discharge: HOME | End: 2024-06-19
Attending: EMERGENCY MEDICINE | Admitting: INTERNAL MEDICINE
Payer: MEDICARE

## 2024-06-19 VITALS
OXYGEN SATURATION: 99 % | WEIGHT: 162.92 LBS | HEIGHT: 72 IN | SYSTOLIC BLOOD PRESSURE: 145 MMHG | TEMPERATURE: 98 F | DIASTOLIC BLOOD PRESSURE: 81 MMHG | HEART RATE: 86 BPM | RESPIRATION RATE: 16 BRPM

## 2024-06-19 PROCEDURE — 99285 EMERGENCY DEPT VISIT HI MDM: CPT

## 2024-06-19 NOTE — ED PROVIDER NOTE - PHYSICAL EXAMINATION
Posterior cervical spine noted healing surgical scar, drain tube in place with small amount of bleeding coming around the drainage tube

## 2024-06-19 NOTE — ED PROVIDER NOTE - NSFOLLOWUPINSTRUCTIONS_ED_ALL_ED_FT
Follow up with Dr Yu today.  Return to the emergency room  for pain, increased  swelling , fever, leakage from the wound or any changes

## 2024-06-19 NOTE — ED PROVIDER NOTE - CARE PROVIDER_API CALL
Bacilio Richards  Orthopaedic Surgery  [address]  [place] [address]  Phone: [phone]  Fax: [phone]  Follow Up Time:     Bertin River  Plastic Surgery  [address]  [place] [address]  Phone: [phone]  Fax: [phone]  Follow Up Time:

## 2024-06-19 NOTE — ED ADULT NURSE NOTE - OBJECTIVE STATEMENT
Arrived via ambulance from  [facility]. Pt stated he had surgery on upper back recently, a SERGEI was in place mid upper back that began to leak bright red blood from site of insertion. He stated he is not sure what happened. Denies pulling and confirmed tubing is same length as it was on day of surgery. The nurse sent him in for evaluation. He denies pain at this time. Dressing observed intact with moderate bright red bleeding

## 2024-06-19 NOTE — ED PROVIDER NOTE - CLINICAL SUMMARY MEDICAL DECISION MAKING FREE TEXT BOX
78-year-old male with increased bloody drainage from surgical drainage tube site, upon inspection wound looks clean, no signs of outward cellulitis, will follow-up CBC, CMP, coagulation studies, CT soft tissue neck and reevaluate.

## 2024-06-19 NOTE — ED PROVIDER NOTE - PATIENT PORTAL LINK FT
You can access the FollowMyHealth Patient Portal offered by [facility] by registering at the following website: [email or web address]. By joining [email or web address]’s FollowMyHealth portal, you will also be able to view your health information using other applications (apps) compatible with our system.

## 2024-06-19 NOTE — ED ADULT TRIAGE NOTE - CHIEF COMPLAINT QUOTE
Patient brought by ambulance from [facility] as reported had cervical 1 and 2 surgery last Sunday as discharged with drain to site noted to be bleeding

## 2024-06-19 NOTE — ED PROVIDER NOTE - PROGRESS NOTE DETAILS
Patient states that he is cervical drain balloon was not deflated, likely causing decreased drainage of the tube and accumulation of fluid, balloon now is deflated with small amount of serosanguineous fluid, awaiting CBC, CMP, CT of the neck

## 2024-06-19 NOTE — ED ADULT NURSE NOTE - NSFALLRISKINTERV_ED_ALL_ED
Assistance OOB with selected safe patient handling equipment if applicable/Assistance with ambulation/Communicate fall risk and risk factors to all staff, patient, and family/Monitor gait and stability/Provide visual cue: yellow wristband, yellow gown, etc/Reinforce activity limits and safety measures with patient and family/Call bell, personal items and telephone in reach/Instruct patient to call for assistance before getting out of bed/chair/stretcher/Non-slip footwear applied when patient is off stretcher/[place] to call system/Physically safe environment - no spills, clutter or unnecessary equipment/Purposeful Proactive Rounding/Room/bathroom lighting operational, light cord in reach

## 2024-06-19 NOTE — ED PROVIDER NOTE - OBJECTIVE STATEMENT
78-year-old male recently admitted to [facility] 5/31/2024 through 6/8/2024 which patient had stenosis of cervical spine, MRI of C-spine showed C2-C3 spinal cord compression, patient had surgery 6/4/2024 posterior cervical decompression and fusion of C1-C2 and bilateral cervical paraspinal muscle flap and complex closure, patient was sent to rehab nursing facility and today noted to have blood on his bed sheets and on his back, patient states he otherwise feels well, denies pain, no fever. Patient currently taking cephalexin 500 mg orally 4 times daily.

## 2024-06-19 NOTE — ED ADULT NURSE NOTE - PRO INTERPRETER NEED 2
Mimi Garcia  [address]  [place] [address]    Dear Ms. Garcia,    Your procedure is scheduled with Dr. Vasyl Davis  on May 12, 2020 at 2:40 pm  at:    [facility]  [address] [place]  [address]  Phone: [phone]  Main Entrance to hospital building, next to the ER entrance.    Stop at Info Desk for Directions to the Pain Management Center or for wheelchair assistance.    Please register at [facility] on May 12, 2020 at 1:40 pm .    You can expect to be contacted 1 to 3 days prior to the surgery to confirm arrival and surgery time. These times may change due to various OR schedule needs. We will call you ASAP if this happens.    To better prepare for your surgery, please follow these instructions:    PRE-PROCEDURE GENERAL GUIDELINES  · No anti-inflammatory medications (e.g. Ibuprofen, Celebrex, etc) for 48 hours before procedure.   · Take all other medications (heart, blood pressure, diabetic, etc) as you normally would unless directed otherwise.  · If you develop an infection, illness or fever, please call [phone] before coming to appointment.  · For ALL injections/procedures with IV Sedation, a  MUST be present upon check in.  · If your injection/procedure is with IV Sedation, please do NOT eat or drink 6 hours prior to your procedure.  · Bathe or shower the evening before or morning of procedure.  · Follow up with primary care physician for all other medical concerns.    If you have questions regarding the procedure, medications, rehab, etc., please contact the nursing staff at [phone] (Pain Line).    If you have any scheduling questions or need to reschedule, please contact me at the telephone number and extension listed below.       Thank you,    Marina at [phone]  Surgery Scheduler for Dr. Vasyl Davis   [facility]      Insurance Authorization Need to Know’s    Prior to your surgical  procedure, our team will contact your Insurance Company to initiate a PreAuthorization request.      This is not a guarantee of payment from your insurance company, but rather a step taken to ensure that we have all of the information and documentation for them to confirm the procedure is one that is eligible for coverage under your plan.    We will contact you if we either need more information from you to fulfill the requirements of your insurance company, or if we need to discuss any concerns that may lead to postponement or cancellation of your procedure. If you have any questions regarding your surgery authorization, please check with your insurance company or call our office for an update.    If you need information regarding your level of benefits or out-of-pocket expenses, please contact your insurance company directly.  They can also confirm for you whether or not we (the surgeon and the hospital/surgery center) are in your plan’s preferred network (aka ‘in-network’).    What to do if… My Insurance Changes:  If, at any time, your insurance company, plan or even card changes… Please call our office so that our team can be sure to update your records.  We will need to make sure to submit any PreAuth or rene to the correct, up-to-date insurance plan.      What to do if… My Insurance Requires A Referral:  If your insurance company requires a Referral for Specialty Care or to see a Specialist, you will need to confirm with them if you have one on file.    - If your insurance carrier does not have a referral, then you will need to contact your Primary Care Physician to have one directly submitted to your insurance company ASAP.    - Without a referral on file, your insurance company will not Pre-Authorize your surgery and may not cover any of your care with our specialty.    What to do if… I have a Workers Compensation (W/C) Claim:  If you have a W/C claim, please be sure to provide our reception team with  the information you have regarding your claim ASAP.  We will contact your W/C carrier/adjustor to inform them of your upcoming surgery and check the status of your claim (open vs closed).  We will let you know if they advise of any concerns or issues with your claim.  - Even if you have an open W/C claim, please also provide us with your personal/family insurance.  We will want to be sure this plan is loaded into your account.  We always PreAuthorize with personal insurance as a back-up to W/C.  Otherwise, if W/C doesn’t cover something along the way, you will receive a bill for the services.    What to do if… I have Month-to-Month Coverage/Premiums:  If you have an insurance plan that is paid for month to month, or is subject to plan change on a monthly basis, please be aware we cannot initiate PreAuthorization until just before the month of your surgery, as your insurance company will need to verify your premium payments/eligibility first.    What to do if… I Do Not Have Insurance Coverage or Have other Insurance/Billing Questions:  Please call our Patient Contact Center:  [phone] to speak with a team member about your billing needs, including possible coverage options, setting up payment plans, our fee schedule, etc.   English

## 2024-06-19 NOTE — ED ADULT NURSE NOTE - CHIEF COMPLAINT QUOTE
Patient brought by ambulance from [facility] as reported had cervical 1 and 2 surgery last Sunday as discharged with drain to site noted to be bleeding

## 2024-06-19 NOTE — ED PROVIDER NOTE - CARE PROVIDERS DIRECT ADDRESSES
[email or web address]

## 2024-06-20 VITALS
OXYGEN SATURATION: 97 % | TEMPERATURE: 97 F | SYSTOLIC BLOOD PRESSURE: 154 MMHG | HEART RATE: 66 BPM | RESPIRATION RATE: 18 BRPM | DIASTOLIC BLOOD PRESSURE: 70 MMHG

## 2024-06-20 PROBLEM — S42.002A FRACTURE OF UNSPECIFIED PART OF LEFT CLAVICLE, INITIAL ENCOUNTER FOR CLOSED FRACTURE: Chronic | Status: ACTIVE | Noted: 2024-05-31

## 2024-06-20 PROBLEM — M48.00 SPINAL STENOSIS, SITE UNSPECIFIED: Chronic | Status: ACTIVE | Noted: 2024-05-31

## 2024-06-20 PROBLEM — F41.9 ANXIETY DISORDER, UNSPECIFIED: Chronic | Status: ACTIVE | Noted: 2024-05-31

## 2024-06-20 LAB
ALBUMIN SERPL ELPH-MCNC: 2.7 G/DL — LOW (ref 3.3–5)
ALP SERPL-CCNC: 65 U/L — SIGNIFICANT CHANGE UP (ref 40–120)
ALT FLD-CCNC: 25 U/L — SIGNIFICANT CHANGE UP (ref 12–78)
ANION GAP SERPL CALC-SCNC: 7 MMOL/L — SIGNIFICANT CHANGE UP (ref 5–17)
APTT BLD: 29.7 SEC — SIGNIFICANT CHANGE UP (ref 24.5–35.6)
AST SERPL-CCNC: 25 U/L — SIGNIFICANT CHANGE UP (ref 15–37)
BASOPHILS # BLD AUTO: 0.02 K/UL — SIGNIFICANT CHANGE UP (ref 0–0.2)
BASOPHILS NFR BLD AUTO: 0.2 % — SIGNIFICANT CHANGE UP (ref 0–2)
BILIRUB SERPL-MCNC: 0.4 MG/DL — SIGNIFICANT CHANGE UP (ref 0.2–1.2)
BUN SERPL-MCNC: 20 MG/DL — SIGNIFICANT CHANGE UP (ref 7–23)
CALCIUM SERPL-MCNC: 8.9 MG/DL — SIGNIFICANT CHANGE UP (ref 8.5–10.1)
CHLORIDE SERPL-SCNC: 102 MMOL/L — SIGNIFICANT CHANGE UP (ref 96–108)
CO2 SERPL-SCNC: 27 MMOL/L — SIGNIFICANT CHANGE UP (ref 22–31)
CREAT SERPL-MCNC: 1.1 MG/DL — SIGNIFICANT CHANGE UP (ref 0.5–1.3)
EGFR: 68 ML/MIN/1.73M2 — SIGNIFICANT CHANGE UP
EOSINOPHIL # BLD AUTO: 0 K/UL — SIGNIFICANT CHANGE UP (ref 0–0.5)
EOSINOPHIL NFR BLD AUTO: 0 % — SIGNIFICANT CHANGE UP (ref 0–6)
GLUCOSE SERPL-MCNC: 223 MG/DL — HIGH (ref 70–99)
HCT VFR BLD CALC: 33 % — LOW (ref 39–50)
HGB BLD-MCNC: 10.4 G/DL — LOW (ref 13–17)
IMM GRANULOCYTES NFR BLD AUTO: 0.4 % — SIGNIFICANT CHANGE UP (ref 0–0.9)
INR BLD: 1.02 RATIO — SIGNIFICANT CHANGE UP (ref 0.85–1.18)
LYMPHOCYTES # BLD AUTO: 0.55 K/UL — LOW (ref 1–3.3)
LYMPHOCYTES # BLD AUTO: 6.8 % — LOW (ref 13–44)
MCHC RBC-ENTMCNC: 28.1 PG — SIGNIFICANT CHANGE UP (ref 27–34)
MCHC RBC-ENTMCNC: 31.5 GM/DL — LOW (ref 32–36)
MCV RBC AUTO: 89.2 FL — SIGNIFICANT CHANGE UP (ref 80–100)
MONOCYTES # BLD AUTO: 0.11 K/UL — SIGNIFICANT CHANGE UP (ref 0–0.9)
MONOCYTES NFR BLD AUTO: 1.4 % — LOW (ref 2–14)
NEUTROPHILS # BLD AUTO: 7.4 K/UL — SIGNIFICANT CHANGE UP (ref 1.8–7.4)
NEUTROPHILS NFR BLD AUTO: 91.2 % — HIGH (ref 43–77)
NRBC # BLD: 0 /100 WBCS — SIGNIFICANT CHANGE UP (ref 0–0)
PLATELET # BLD AUTO: 314 K/UL — SIGNIFICANT CHANGE UP (ref 150–400)
POTASSIUM SERPL-MCNC: 4.7 MMOL/L — SIGNIFICANT CHANGE UP (ref 3.5–5.3)
POTASSIUM SERPL-SCNC: 4.7 MMOL/L — SIGNIFICANT CHANGE UP (ref 3.5–5.3)
PROT SERPL-MCNC: 6.5 G/DL — SIGNIFICANT CHANGE UP (ref 6–8.3)
PROTHROM AB SERPL-ACNC: 11.9 SEC — SIGNIFICANT CHANGE UP (ref 9.5–13)
RBC # BLD: 3.7 M/UL — LOW (ref 4.2–5.8)
RBC # FLD: 13.1 % — SIGNIFICANT CHANGE UP (ref 10.3–14.5)
SODIUM SERPL-SCNC: 136 MMOL/L — SIGNIFICANT CHANGE UP (ref 135–145)
WBC # BLD: 8.11 K/UL — SIGNIFICANT CHANGE UP (ref 3.8–10.5)
WBC # FLD AUTO: 8.11 K/UL — SIGNIFICANT CHANGE UP (ref 3.8–10.5)

## 2024-06-20 PROCEDURE — 36415 COLL VENOUS BLD VENIPUNCTURE: CPT

## 2024-06-20 PROCEDURE — 80053 COMPREHEN METABOLIC PANEL: CPT

## 2024-06-20 PROCEDURE — 70491 CT SOFT TISSUE NECK W/DYE: CPT | Mod: MC

## 2024-06-20 PROCEDURE — 99284 EMERGENCY DEPT VISIT MOD MDM: CPT | Mod: 25

## 2024-06-20 PROCEDURE — 70491 CT SOFT TISSUE NECK W/DYE: CPT | Mod: 26,MC

## 2024-06-20 PROCEDURE — 85730 THROMBOPLASTIN TIME PARTIAL: CPT

## 2024-06-20 PROCEDURE — 85025 COMPLETE CBC W/AUTO DIFF WBC: CPT

## 2024-06-20 PROCEDURE — 85610 PROTHROMBIN TIME: CPT

## 2024-06-21 ENCOUNTER — EMERGENCY (EMERGENCY)
Facility: HOSPITAL | Age: 79
LOS: 1 days | Discharge: HOME | End: 2024-06-21
Attending: STUDENT IN AN ORGANIZED HEALTH CARE EDUCATION/TRAINING PROGRAM | Admitting: STUDENT IN AN ORGANIZED HEALTH CARE EDUCATION/TRAINING PROGRAM
Payer: MEDICARE

## 2024-06-21 VITALS
HEART RATE: 84 BPM | WEIGHT: 164.02 LBS | OXYGEN SATURATION: 96 % | SYSTOLIC BLOOD PRESSURE: 116 MMHG | HEIGHT: 72 IN | RESPIRATION RATE: 16 BRPM | DIASTOLIC BLOOD PRESSURE: 68 MMHG | TEMPERATURE: 98 F

## 2024-06-21 VITALS
OXYGEN SATURATION: 96 % | SYSTOLIC BLOOD PRESSURE: 169 MMHG | RESPIRATION RATE: 16 BRPM | HEART RATE: 76 BPM | DIASTOLIC BLOOD PRESSURE: 79 MMHG | TEMPERATURE: 98 F

## 2024-06-21 PROCEDURE — 99284 EMERGENCY DEPT VISIT MOD MDM: CPT

## 2024-06-21 NOTE — ED PROVIDER NOTE - OBJECTIVE STATEMENT
Patient with a past medical history of spinal stenosis who was recently admitted for weakness in his legs from multilevel stenosis and had spinal cord decompression surgery.  States while at the rehab facility, he has been having decreased drainage from his SERGEI drain site.  The SERGEI drain was pulled today and he was sent to ED for an evaluation.  Patient otherwise denies any complaints at this time.  States that he noted very little output in the SERGEI drain over the past day.

## 2024-06-21 NOTE — ED PROVIDER NOTE - PATIENT PORTAL LINK FT
You can access the FollowMyHealth Patient Portal offered by [facility] by registering at the following website: [email or web address]. By joining [employer]’s FollowMyHealth portal, you will also be able to view your health information using other applications (apps) compatible with our system.

## 2024-06-21 NOTE — ED PROVIDER NOTE - PROGRESS NOTE DETAILS
Case discussed with Dr. River, states that it is okay the SERGEI drain was removed.  I reviewed the CT scan done from yesterday as well.  States that patients typically have fluid still postoperatively.  Recommend the patient follow-up with his office next week.  Patient states he does have an appointment next week.  Given this, will plan for discharge.  Will set up medical transport back to facility.

## 2024-06-21 NOTE — ED ADULT NURSE NOTE - OBJECTIVE STATEMENT
pt aox4, " S/P Had cervical spinal fusion 2 weeks ago , drainage port not working well, removed by N Washington MD this morning " no body weakness, back of neck Dsg intact

## 2024-06-21 NOTE — ED PROVIDER NOTE - CARE PROVIDER_API CALL
Bertin River  Plastic Surgery  [address]  [place] [address]  Phone: [phone]  Fax: [phone]  Follow Up Time: 4-6 Days

## 2024-06-21 NOTE — ED ADULT NURSE REASSESSMENT NOTE - NS ED NURSE REASSESS COMMENT FT1
Pt report received from previous RN Dinesh. Pt contact made. Pt has no new complaints at this time. Pt is A&Ox4 and speaking in full sentences. Pt is resting comfortably in chair, VSS, safety precautions maintained. Awaiting  consult  at this time. Questions asked and answered.

## 2024-06-21 NOTE — ED ADULT NURSE NOTE - NSFALLHARMRISKINTERV_ED_ALL_ED
Assistance OOB with selected safe patient handling equipment if applicable/Communicate risk of Fall with Harm to all staff, patient, and family/Provide visual cue: red socks, yellow wristband, yellow gown, etc/Reinforce activity limits and safety measures with patient and family/Bed in lowest position, wheels locked, appropriate side rails in place/Call bell, personal items and telephone in reach/Instruct patient to call for assistance before getting out of bed/chair/stretcher/Non-slip footwear applied when patient is off stretcher/[place] to call system/Physically safe environment - no spills, clutter or unnecessary equipment/Purposeful Proactive Rounding/Room/bathroom lighting operational, light cord in reach

## 2024-06-21 NOTE — ED PROVIDER NOTE - PHYSICAL EXAMINATION
Constitutional: Awake, Alert, non-toxic. No acute distress.  HEAD: Normocephalic, atraumatic.   EYES: EOM intact, conjunctiva and sclera are clear bilaterally.  ENT: External ears normal. No rhinorrhea, no tracheal deviation   NECK: c collar in place, dry bandage over posterior neck with no drainage noted  CARDIOVASCULAR: regular rate  RESPIRATORY: Normal respiratory effort; Speaking in full sentences. No accessory muscle use.   MSK:  no lower extremity edema, no deformities  SKIN: Warm, dry  NEURO: A&O x3. follows commands in all extremities, able to stand up with assistance. Speech is normal.  PSYCH: Appearance and judgement seem appropriate for gender and age.

## 2024-06-21 NOTE — ED PROVIDER NOTE - NSFOLLOWUPINSTRUCTIONS_ED_ALL_ED_FT
Please follow-up with Dr. River next week as discussed.  Dressing should be changed once per day.    1) Follow-up with your Primary Medical Doctor or referred doctor. Call today / next business day for prompt follow-up.  2) Call 911 or go to the ED should your symptoms worsen, or should you develop any concerns whatsoever regarding your condition. Call 911 or return to the ED if you develop a fever greater than 101 F. Return to the ED if you develop chest pain, shortness of breath, weakness or ANY WORSENING OR CONCERNING SYMPTOM.  3)Your health is important to us. Should you have any concerns or questions about your condition, please do not hesitate to return to the Emergency Department.  4) See attached instruction sheets for additional information, including information regarding signs and symptoms to look out for, reasons to seek immediate care and other important instructions.  5) Follow-up with any specialists as discussed / noted as well.  6) If you were prescribed medications, please take them as prescribed.

## 2024-06-21 NOTE — ED ADULT TRIAGE NOTE - NS ED NURSE AMBULANCES
82 year old Male comes to the ER after a trip and fall over someone who had their feet out. Patient hit his face on the floor. Nose is deformed. Some dried blood present to head and bridge of nose. Patient denies loss of consciousness, blurry vision, nausea, vomiting, dizziness, anticoagulation. Patient reports headache. A&Ox4, breathing spontaneous and unlabored, palpable pulses, skin color normal for ethnicity. Denies other recent falls, use of assistive devices. Family at bedside. [facility]

## 2024-06-21 NOTE — ED PROVIDER NOTE - CLINICAL SUMMARY MEDICAL DECISION MAKING FREE TEXT BOX
Case discussed with [facility] for rehabilitation and nursing, states there is about 5 cc of output in the drain over the past 24 hours.  Patient otherwise without complaints.  Case discussed with orthopedics team who is recommending her reach out to plastic surgery in regard for discussion about drain.  Pending callback from Dr. River.

## 2024-09-25 ENCOUNTER — EMERGENCY (EMERGENCY)
Facility: HOSPITAL | Age: 79
LOS: 1 days | Discharge: HOME | End: 2024-09-25
Attending: EMERGENCY MEDICINE | Admitting: EMERGENCY MEDICINE
Payer: MEDICARE

## 2024-09-25 VITALS
OXYGEN SATURATION: 96 % | TEMPERATURE: 98 F | WEIGHT: 160.06 LBS | RESPIRATION RATE: 16 BRPM | DIASTOLIC BLOOD PRESSURE: 84 MMHG | HEART RATE: 78 BPM | SYSTOLIC BLOOD PRESSURE: 153 MMHG

## 2024-09-25 VITALS
DIASTOLIC BLOOD PRESSURE: 75 MMHG | HEART RATE: 70 BPM | TEMPERATURE: 98 F | OXYGEN SATURATION: 96 % | SYSTOLIC BLOOD PRESSURE: 140 MMHG | RESPIRATION RATE: 16 BRPM

## 2024-09-25 PROCEDURE — 99284 EMERGENCY DEPT VISIT MOD MDM: CPT | Mod: 25

## 2024-09-25 PROCEDURE — 72125 CT NECK SPINE W/O DYE: CPT | Mod: 26,MC

## 2024-09-25 PROCEDURE — 70450 CT HEAD/BRAIN W/O DYE: CPT | Mod: MC

## 2024-09-25 PROCEDURE — 70450 CT HEAD/BRAIN W/O DYE: CPT | Mod: 26,MC

## 2024-09-25 PROCEDURE — 99284 EMERGENCY DEPT VISIT MOD MDM: CPT | Mod: FS

## 2024-09-25 PROCEDURE — 72125 CT NECK SPINE W/O DYE: CPT | Mod: MC

## 2025-02-07 NOTE — OCCUPATIONAL THERAPY INITIAL EVALUATION ADULT - NAME OF CLINICIAN
Patient did not do bowel prep.He did not even  any solution from his Pharmacy.Instructed to call 's office to reschedule.   Milvia TOLEDO and SW

## (undated) DEVICE — PREP DURAPREP 26CC

## (undated) DEVICE — DRAPE TOWEL BLUE 17" X 24"

## (undated) DEVICE — Device

## (undated) DEVICE — TUBING CODMAN INTEGRATED BIPOLAR CORD & TUBING SET FLYING LEADS

## (undated) DEVICE — PLV-MALIS BIPOLAR UNIT #1 IR5132: Type: DURABLE MEDICAL EQUIPMENT

## (undated) DEVICE — SUT VICRYL PLUS 2-0 27" CT-1 (POP-OFF)

## (undated) DEVICE — VENODYNE/SCD SLEEVE CALF LARGE

## (undated) DEVICE — GLV 8 DURAPRENE

## (undated) DEVICE — DRAPE SURGICAL #1010

## (undated) DEVICE — SYR LUER LOK 30CC

## (undated) DEVICE — DRAIN RESERVOIR FOR JACKSON PRATT 100CC CARDINAL

## (undated) DEVICE — DRAPE 3/4 SHEET W REINFORCEMENT 56X77"

## (undated) DEVICE — MIDAS REX LEGEND MATCH HEAD FLUTED SM BORE 3.0MM X 10CM

## (undated) DEVICE — SPECIMEN CONTAINER 4OZ

## (undated) DEVICE — MIDAS REX LEGEND LUBRICANT DIFFUSER CARTRIDGE

## (undated) DEVICE — PACK HIP FRACTURE

## (undated) DEVICE — WARMING BLANKET UPPER ADULT

## (undated) DEVICE — SOL IRR POUR NS 0.9% 500ML

## (undated) DEVICE — DRSG STERISTRIPS 0.5 X 4"

## (undated) DEVICE — DRAIN JACKSON PRATT 3 SPRING RESERVOIR W 10FR PVC DRAIN

## (undated) DEVICE — DRAPE INSTRUMENT POUCH 6.75" X 11"

## (undated) DEVICE — DRSG AQUACEL 3.5 X 6"

## (undated) DEVICE — PLV-SCD MACHINE: Type: DURABLE MEDICAL EQUIPMENT

## (undated) DEVICE — SYR LUER LOK 20CC

## (undated) DEVICE — PACK MINOR WITH LAP

## (undated) DEVICE — DRAPE C ARM UNIVERSAL

## (undated) DEVICE — DRAPE MAYO STAND 23"

## (undated) DEVICE — GLV 8 PROTEXIS ORTHO (CREAM)

## (undated) DEVICE — ELCTR BOVIE TIP BLADE INSULATED 4" EDGE

## (undated) DEVICE — DRAPE IOBAN 23" X 23"

## (undated) DEVICE — SUT MONOCRYL 3-0 18" PS-2 UNDYED

## (undated) DEVICE — VENODYNE/SCD SLEEVE CALF MEDIUM

## (undated) DEVICE — DRSG TAPE MEDIPORE 3"

## (undated) DEVICE — ELCTR AQUAMANTYS BIPOLAR SEALER 6.0

## (undated) DEVICE — NDL HYPO SAFE 18G X 1.5" (PINK)

## (undated) DEVICE — FRAZIER SUCTION TIP 12FR

## (undated) DEVICE — STRYKER BONE MILL & MEDIUM BLADE